# Patient Record
Sex: MALE | Race: WHITE | Employment: FULL TIME | URBAN - NONMETROPOLITAN AREA
[De-identification: names, ages, dates, MRNs, and addresses within clinical notes are randomized per-mention and may not be internally consistent; named-entity substitution may affect disease eponyms.]

---

## 2018-04-18 ENCOUNTER — HOSPITAL ENCOUNTER (OUTPATIENT)
Dept: ULTRASOUND IMAGING | Age: 58
Discharge: HOME | End: 2018-04-18
Payer: COMMERCIAL

## 2018-04-18 ENCOUNTER — HOSPITAL ENCOUNTER (OUTPATIENT)
Dept: CT IMAGING | Age: 58
Discharge: HOME | End: 2018-04-18
Payer: COMMERCIAL

## 2018-04-18 DIAGNOSIS — R31.0 GROSS HEMATURIA: ICD-10-CM

## 2018-04-18 PROCEDURE — 74178 CT ABD&PLV WO CNTR FLWD CNTR: CPT

## 2018-04-18 PROCEDURE — 76870 US EXAM SCROTUM: CPT

## 2018-04-18 PROCEDURE — 6360000004 HC RX CONTRAST MEDICATION: Performed by: UROLOGY

## 2018-04-18 RX ADMIN — IOPAMIDOL 100 ML: 755 INJECTION, SOLUTION INTRAVENOUS at 16:09

## 2018-09-24 ENCOUNTER — HOSPITAL ENCOUNTER (EMERGENCY)
Age: 58
Discharge: HOME | End: 2018-09-24
Attending: EMERGENCY MEDICINE
Payer: COMMERCIAL

## 2018-09-24 ENCOUNTER — APPOINTMENT (OUTPATIENT)
Dept: GENERAL RADIOLOGY | Age: 58
End: 2018-09-24
Payer: COMMERCIAL

## 2018-09-24 VITALS
WEIGHT: 265 LBS | OXYGEN SATURATION: 96 % | BODY MASS INDEX: 35.12 KG/M2 | DIASTOLIC BLOOD PRESSURE: 59 MMHG | SYSTOLIC BLOOD PRESSURE: 106 MMHG | RESPIRATION RATE: 16 BRPM | HEART RATE: 76 BPM | TEMPERATURE: 96.4 F | HEIGHT: 73 IN

## 2018-09-24 DIAGNOSIS — S46.912A LEFT SHOULDER STRAIN, INITIAL ENCOUNTER: Primary | ICD-10-CM

## 2018-09-24 PROCEDURE — 73030 X-RAY EXAM OF SHOULDER: CPT

## 2018-09-24 PROCEDURE — 2709999900 HC NON-CHARGEABLE SUPPLY

## 2018-09-24 PROCEDURE — 99283 EMERGENCY DEPT VISIT LOW MDM: CPT

## 2018-09-24 PROCEDURE — 6370000000 HC RX 637 (ALT 250 FOR IP): Performed by: EMERGENCY MEDICINE

## 2018-09-24 RX ORDER — IBUPROFEN 800 MG/1
800 TABLET ORAL ONCE
Status: COMPLETED | OUTPATIENT
Start: 2018-09-24 | End: 2018-09-24

## 2018-09-24 RX ADMIN — IBUPROFEN 800 MG: 800 TABLET ORAL at 12:04

## 2018-09-24 ASSESSMENT — PAIN DESCRIPTION - PAIN TYPE
TYPE: ACUTE PAIN
TYPE: ACUTE PAIN

## 2018-09-24 ASSESSMENT — PAIN DESCRIPTION - ORIENTATION
ORIENTATION: LEFT
ORIENTATION: LEFT

## 2018-09-24 ASSESSMENT — PAIN SCALES - GENERAL
PAINLEVEL_OUTOF10: 7
PAINLEVEL_OUTOF10: 9

## 2018-09-24 ASSESSMENT — PAIN DESCRIPTION - DESCRIPTORS
DESCRIPTORS: ACHING
DESCRIPTORS: ACHING

## 2018-09-24 ASSESSMENT — PAIN DESCRIPTION - LOCATION
LOCATION: SHOULDER
LOCATION: SHOULDER

## 2018-09-24 ASSESSMENT — ENCOUNTER SYMPTOMS: BACK PAIN: 0

## 2018-09-26 ENCOUNTER — HOSPITAL ENCOUNTER (OUTPATIENT)
Dept: GENERAL RADIOLOGY | Age: 58
Discharge: HOME | End: 2018-09-26
Payer: COMMERCIAL

## 2018-09-26 VITALS
WEIGHT: 265 LBS | OXYGEN SATURATION: 97 % | TEMPERATURE: 98.3 F | DIASTOLIC BLOOD PRESSURE: 60 MMHG | HEIGHT: 73 IN | HEART RATE: 74 BPM | RESPIRATION RATE: 16 BRPM | BODY MASS INDEX: 35.12 KG/M2 | SYSTOLIC BLOOD PRESSURE: 120 MMHG

## 2018-09-26 PROCEDURE — 99212 OFFICE O/P EST SF 10 MIN: CPT

## 2018-09-26 NOTE — PROGRESS NOTES
Pulse regular. Extremities warm. Respirations regular and even. Mucous membranes pink & moist. Alert et oriented x3. No nausea or vomiting. Range of motion within patient's limits. Skin pink, warm & dry. Calm et cooperative. No complaints. Patient released in stable, ambulatory condition to self.

## 2018-09-26 NOTE — PROGRESS NOTES
Pulse regular. Extremities warm. Respirations regular and quiet. Mucous membranes pink & moist. Alert and oriented times 3. No nausea or vomiting. Range of motion within patient's limits. Skin pink, warm and dry. Calm and cooperative. Patient is here for a recheck of his left shoulder after falling on it at work on 9/24/18. Pain is down to a 2/10 today and he wants to go back to working full duty.

## 2018-12-27 ENCOUNTER — HOSPITAL ENCOUNTER (OUTPATIENT)
Age: 58
End: 2018-12-27

## 2018-12-27 ENCOUNTER — HOSPITAL ENCOUNTER (OUTPATIENT)
Dept: MRI IMAGING | Age: 58
Discharge: HOME | End: 2018-12-27
Payer: COMMERCIAL

## 2018-12-27 DIAGNOSIS — S46.012A STRAIN OF MUSC/TEND THE ROTATOR CUFF OF LEFT SHOULDER, INIT: ICD-10-CM

## 2019-10-16 ENCOUNTER — HOSPITAL ENCOUNTER (OUTPATIENT)
Dept: GENERAL RADIOLOGY | Age: 59
Discharge: HOME | End: 2019-10-16
Payer: COMMERCIAL

## 2019-10-16 ENCOUNTER — HOSPITAL ENCOUNTER (OUTPATIENT)
Age: 59
Discharge: HOME | End: 2019-10-16
Payer: COMMERCIAL

## 2019-10-16 ENCOUNTER — OFFICE VISIT (OUTPATIENT)
Dept: RHEUMATOLOGY | Age: 59
End: 2019-10-16
Payer: COMMERCIAL

## 2019-10-16 VITALS
WEIGHT: 280 LBS | OXYGEN SATURATION: 97 % | DIASTOLIC BLOOD PRESSURE: 76 MMHG | HEIGHT: 73 IN | SYSTOLIC BLOOD PRESSURE: 134 MMHG | HEART RATE: 60 BPM | BODY MASS INDEX: 37.11 KG/M2

## 2019-10-16 DIAGNOSIS — M25.50 POLYARTHRALGIA: ICD-10-CM

## 2019-10-16 DIAGNOSIS — M54.42 CHRONIC MIDLINE LOW BACK PAIN WITH BILATERAL SCIATICA: ICD-10-CM

## 2019-10-16 DIAGNOSIS — R53.83 FATIGUE, UNSPECIFIED TYPE: ICD-10-CM

## 2019-10-16 DIAGNOSIS — M25.50 POLYARTHRALGIA: Primary | ICD-10-CM

## 2019-10-16 DIAGNOSIS — M25.50 PAIN IN JOINT, MULTIPLE SITES: ICD-10-CM

## 2019-10-16 DIAGNOSIS — G89.29 CHRONIC MIDLINE LOW BACK PAIN WITH BILATERAL SCIATICA: ICD-10-CM

## 2019-10-16 DIAGNOSIS — M54.41 CHRONIC MIDLINE LOW BACK PAIN WITH BILATERAL SCIATICA: ICD-10-CM

## 2019-10-16 LAB
ALBUMIN SERPL-MCNC: 4.6 G/DL (ref 3.5–5.1)
ALP BLD-CCNC: 67 U/L (ref 38–126)
ALT SERPL-CCNC: 27 U/L (ref 11–66)
ANION GAP SERPL CALCULATED.3IONS-SCNC: 12 MEQ/L (ref 8–16)
AST SERPL-CCNC: 18 U/L (ref 5–40)
BASOPHILS # BLD: 0.3 %
BASOPHILS ABSOLUTE: 0 THOU/MM3 (ref 0–0.1)
BILIRUB SERPL-MCNC: 0.5 MG/DL (ref 0.3–1.2)
BUN BLDV-MCNC: 27 MG/DL (ref 7–22)
C-REACTIVE PROTEIN: 0.25 MG/DL (ref 0–1)
CALCIUM SERPL-MCNC: 10 MG/DL (ref 8.5–10.5)
CHLORIDE BLD-SCNC: 99 MEQ/L (ref 98–111)
CO2: 25 MEQ/L (ref 23–33)
CREAT SERPL-MCNC: 1.2 MG/DL (ref 0.4–1.2)
EOSINOPHIL # BLD: 0.5 %
EOSINOPHILS ABSOLUTE: 0 THOU/MM3 (ref 0–0.4)
ERYTHROCYTE [DISTWIDTH] IN BLOOD BY AUTOMATED COUNT: 12.4 % (ref 11.5–14.5)
ERYTHROCYTE [DISTWIDTH] IN BLOOD BY AUTOMATED COUNT: 39.9 FL (ref 35–45)
GFR SERPL CREATININE-BSD FRML MDRD: 62 ML/MIN/1.73M2
GLUCOSE BLD-MCNC: 116 MG/DL (ref 70–108)
HCT VFR BLD CALC: 40.6 % (ref 42–52)
HEMOGLOBIN: 13.4 GM/DL (ref 14–18)
IMMATURE GRANS (ABS): 0.04 THOU/MM3 (ref 0–0.07)
IMMATURE GRANULOCYTES: 0.7 %
LYMPHOCYTES # BLD: 14.4 %
LYMPHOCYTES ABSOLUTE: 0.9 THOU/MM3 (ref 1–4.8)
MCH RBC QN AUTO: 29.1 PG (ref 26–33)
MCHC RBC AUTO-ENTMCNC: 33 GM/DL (ref 32.2–35.5)
MCV RBC AUTO: 88.3 FL (ref 80–94)
MONOCYTES # BLD: 4.2 %
MONOCYTES ABSOLUTE: 0.3 THOU/MM3 (ref 0.4–1.3)
NUCLEATED RED BLOOD CELLS: 0 /100 WBC
PLATELET # BLD: 244 THOU/MM3 (ref 130–400)
PMV BLD AUTO: 10.3 FL (ref 9.4–12.4)
POTASSIUM SERPL-SCNC: 4.6 MEQ/L (ref 3.5–5.2)
RBC # BLD: 4.6 MILL/MM3 (ref 4.7–6.1)
SEDIMENTATION RATE, ERYTHROCYTE: 10 MM/HR (ref 0–10)
SEG NEUTROPHILS: 79.9 %
SEGMENTED NEUTROPHILS ABSOLUTE COUNT: 4.8 THOU/MM3 (ref 1.8–7.7)
SODIUM BLD-SCNC: 136 MEQ/L (ref 135–145)
TOTAL PROTEIN: 7.7 G/DL (ref 6.1–8)
URIC ACID: 6.5 MG/DL (ref 3.7–7)
WBC # BLD: 6 THOU/MM3 (ref 4.8–10.8)

## 2019-10-16 PROCEDURE — 72072 X-RAY EXAM THORAC SPINE 3VWS: CPT

## 2019-10-16 PROCEDURE — 86140 C-REACTIVE PROTEIN: CPT

## 2019-10-16 PROCEDURE — 86235 NUCLEAR ANTIGEN ANTIBODY: CPT

## 2019-10-16 PROCEDURE — 73630 X-RAY EXAM OF FOOT: CPT

## 2019-10-16 PROCEDURE — 73130 X-RAY EXAM OF HAND: CPT

## 2019-10-16 PROCEDURE — 84550 ASSAY OF BLOOD/URIC ACID: CPT

## 2019-10-16 PROCEDURE — 36415 COLL VENOUS BLD VENIPUNCTURE: CPT

## 2019-10-16 PROCEDURE — 86038 ANTINUCLEAR ANTIBODIES: CPT

## 2019-10-16 PROCEDURE — 80074 ACUTE HEPATITIS PANEL: CPT

## 2019-10-16 PROCEDURE — 86200 CCP ANTIBODY: CPT

## 2019-10-16 PROCEDURE — 85651 RBC SED RATE NONAUTOMATED: CPT

## 2019-10-16 PROCEDURE — 85025 COMPLETE CBC W/AUTO DIFF WBC: CPT

## 2019-10-16 PROCEDURE — 99244 OFF/OP CNSLTJ NEW/EST MOD 40: CPT | Performed by: INTERNAL MEDICINE

## 2019-10-16 PROCEDURE — 72080 X-RAY EXAM THORACOLMB 2/> VW: CPT

## 2019-10-16 PROCEDURE — 86430 RHEUMATOID FACTOR TEST QUAL: CPT

## 2019-10-16 PROCEDURE — 80053 COMPREHEN METABOLIC PANEL: CPT

## 2019-10-16 RX ORDER — SPIRONOLACTONE 25 MG/1
25 TABLET ORAL DAILY
COMMUNITY

## 2019-10-16 RX ORDER — PREDNISONE 1 MG/1
TABLET ORAL
Qty: 40 TABLET | Refills: 0 | Status: SHIPPED | OUTPATIENT
Start: 2019-10-16 | End: 2019-11-01

## 2019-10-16 RX ORDER — TAMSULOSIN HYDROCHLORIDE 0.4 MG/1
0.4 CAPSULE ORAL DAILY
COMMUNITY
End: 2021-12-30

## 2019-10-16 RX ORDER — CETIRIZINE HYDROCHLORIDE 10 MG/1
10 TABLET ORAL DAILY
COMMUNITY

## 2019-10-16 ASSESSMENT — ENCOUNTER SYMPTOMS
EYE PAIN: 0
COUGH: 0
EYE REDNESS: 0
BACK PAIN: 1
CONSTIPATION: 0
VOMITING: 0
EYES NEGATIVE: 1
WHEEZING: 0
NAUSEA: 0
SHORTNESS OF BREATH: 0
DIARRHEA: 0

## 2019-10-17 LAB
HAV IGM SER IA-ACNC: NEGATIVE
HEPATITIS B CORE IGM ANTIBODY: NEGATIVE
HEPATITIS B SURFACE ANTIGEN: NEGATIVE
HEPATITIS C ANTIBODY: NEGATIVE
RHEUMATOID FACTOR: 15 IU/ML (ref 0–13)

## 2019-10-19 LAB
ANA SCREEN: DETECTED
ANTI SSA: NORMAL
ANTI SSB: 0 AU/ML (ref 0–40)
CYCLIC CITRULLIN PEPTIDE AB: 5 UNITS (ref 0–19)

## 2019-10-20 LAB
ANA PATTERN 2: ABNORMAL
ANA PATTERN: ABNORMAL
ANA TITER 2: ABNORMAL
ANA TITER: ABNORMAL
ANTINUCLEAR AB INTERPRETIVE COMMENT: ABNORMAL
ANTINUCLEAR ANTIBODY, HEP-2, IGG: DETECTED
CYTOPLASMIC PATTERN TITER: ABNORMAL

## 2019-10-21 DIAGNOSIS — R76.8 ANA POSITIVE: Primary | ICD-10-CM

## 2019-10-31 ENCOUNTER — TELEPHONE (OUTPATIENT)
Dept: RHEUMATOLOGY | Age: 59
End: 2019-10-31

## 2019-11-06 ENCOUNTER — NURSE ONLY (OUTPATIENT)
Dept: LAB | Age: 59
End: 2019-11-06

## 2019-11-06 DIAGNOSIS — R76.8 ANA POSITIVE: ICD-10-CM

## 2020-01-23 ENCOUNTER — OFFICE VISIT (OUTPATIENT)
Dept: RHEUMATOLOGY | Age: 60
End: 2020-01-23
Payer: COMMERCIAL

## 2020-01-23 ENCOUNTER — HOSPITAL ENCOUNTER (OUTPATIENT)
Age: 60
Discharge: HOME | End: 2020-01-23
Payer: COMMERCIAL

## 2020-01-23 VITALS
HEART RATE: 80 BPM | WEIGHT: 276.8 LBS | SYSTOLIC BLOOD PRESSURE: 92 MMHG | BODY MASS INDEX: 36.68 KG/M2 | HEIGHT: 73 IN | OXYGEN SATURATION: 96 % | DIASTOLIC BLOOD PRESSURE: 60 MMHG

## 2020-01-23 DIAGNOSIS — M46.90 AXIAL SPONDYLOARTHRITIS (HCC): ICD-10-CM

## 2020-01-23 DIAGNOSIS — G89.29 CHRONIC MIDLINE LOW BACK PAIN WITH BILATERAL SCIATICA: ICD-10-CM

## 2020-01-23 DIAGNOSIS — Z51.81 MEDICATION MONITORING ENCOUNTER: ICD-10-CM

## 2020-01-23 DIAGNOSIS — M54.42 CHRONIC MIDLINE LOW BACK PAIN WITH BILATERAL SCIATICA: ICD-10-CM

## 2020-01-23 DIAGNOSIS — M54.41 CHRONIC MIDLINE LOW BACK PAIN WITH BILATERAL SCIATICA: ICD-10-CM

## 2020-01-23 PROCEDURE — 99214 OFFICE O/P EST MOD 30 MIN: CPT | Performed by: INTERNAL MEDICINE

## 2020-01-23 PROCEDURE — 86480 TB TEST CELL IMMUN MEASURE: CPT

## 2020-01-23 PROCEDURE — 90670 PCV13 VACCINE IM: CPT | Performed by: INTERNAL MEDICINE

## 2020-01-23 PROCEDURE — 90471 IMMUNIZATION ADMIN: CPT | Performed by: INTERNAL MEDICINE

## 2020-01-23 ASSESSMENT — ENCOUNTER SYMPTOMS
EYES NEGATIVE: 1
EYE PAIN: 0
COUGH: 0
DIARRHEA: 0
EYE REDNESS: 0
WHEEZING: 0
BACK PAIN: 1
SHORTNESS OF BREATH: 0
NAUSEA: 0
CONSTIPATION: 0
VOMITING: 0

## 2020-01-23 NOTE — PROGRESS NOTES
fevers. : Aggravating -- wt bearing, alleviating -- non-wt bearing. , naproxen. Aggrevating : increased use. Alleviating: decreased. AM stiffness lasting all day. Thickening toenails, fatigue, + itching/redness of eyes w/ occasional eye pain, crusting of eyes in mornings improvedd w/ eye drops. -denies Photosenstivity, Rash, dry mouth/dry eyes, oral/nasal sores, Raynaud's, digital ulcerations, skin tightening, renal disease,foamy urination, hematuria, sz's, blood clots, AIHA,leukpenia/lymphopenia, thrombocytopenia, hair loss, serositis, s enthesitis, dactylitis, hx of STD,  personal or family history of Psoriatic arthritis, psoriasis, ank spond,       Cancer screening: up to date   Travel: denies   Exposure(s): denies   Occupation -- press opperator    Review of Systems    Review of Systems   Constitutional: Positive for fatigue. Negative for diaphoresis and fever. HENT: Negative for congestion, hearing loss and nosebleeds. Eyes: Negative. Negative for pain and redness. Respiratory: Negative for cough, shortness of breath and wheezing. Cardiovascular: Negative. Negative for chest pain. Gastrointestinal: Negative for constipation, diarrhea, nausea and vomiting. Genitourinary: Negative for difficulty urinating, frequency and hematuria. Musculoskeletal: Positive for arthralgias, back pain and myalgias. Negative for joint swelling. Skin: Negative for rash. Neurological: Negative for dizziness, weakness and headaches. Hematological: Does not bruise/bleed easily. Psychiatric/Behavioral: Positive for sleep disturbance (secondary to foot pain). The patient is not nervous/anxious.               PAST MEDICAL HISTORY      Past Medical History:   Diagnosis Date    GERD (gastroesophageal reflux disease)     Hypertension     Neuropathy     Osteoarthritis        PAST SURGICAL HISTORY      Past Surgical History:   Procedure Laterality Date    COLONOSCOPY  12/2019   Marsha Medina FOOT SURGERY  2009    atraumatic. Right Ear: External ear normal.      Left Ear: External ear normal.   Eyes:      Conjunctiva/sclera: Conjunctivae normal.      Pupils: Pupils are equal, round, and reactive to light. Neck:      Musculoskeletal: Neck supple. Cardiovascular:      Rate and Rhythm: Normal rate and regular rhythm. Heart sounds: Normal heart sounds. Pulmonary:      Effort: Pulmonary effort is normal.      Breath sounds: Normal breath sounds. Musculoskeletal: Normal range of motion. Lymphadenopathy:      Cervical: No cervical adenopathy. Skin:     General: Skin is warm and dry. Comments: Nail pitting bilat hands. Onycholysis toenails   Neurological:      Mental Status: He is alert and oriented to person, place, and time. Psychiatric:         Mood and Affect: Affect normal.             Musculoskeletal:     Normal gait. Strength 5/5 in biceps, triceps, hips, knees,      Upper extremities:   Shoulders: Non-tender, No swelling , No Deformities and intact ROM  Elbows:  . Non-tender, No swelling ,   Wrists  Tender - bilat. Hands:  CMCs: non-tender, no swelling  MCPs tender bilateral 2-5 , NO swelling  PIPs Tender bilat 2-5, boggy left 3,4 , right 3,4   DIPs [place]berden nodes bilat. + tender (mild) bilat. Lower extremities:  Hips: Non-tender, No swelling , No Deformities and intact ROM  Knees : tender - warmth - Rt knee. Ankles: Tender bilat. No swelling , No Deformities and intact ROM  Feet:   - tender bilat. Mid foot. & MTPs   - swan neck changes of 2nd left oe. - pes planus   Spine:     C-spine: intact ROM, Non-tender, no swelling. no pain to palpation  + sakina, + Rt SLR , + shober 10-14 cm. Occiput to wall testing.          RAPID3 Composite Score MDHAQ (0-10) + Patient pain VAS (0-10): + Patient global assessment VAS (0-10):     1/23/2020 --- RAPID 3: 2 + 7.5 + 5.5 = 15       Remission: <3  Low Disease Activity: <6  Moderate Disease Activity: >=6 and <=12  High Disease Activity: >12    LABS        CBC  Lab Results   Component Value Date    WBC 6.0 10/16/2019    RBC 4.60 10/16/2019    HGB 13.4 10/16/2019    HCT 40.6 10/16/2019    MCV 88.3 10/16/2019    MCH 29.1 10/16/2019    MCHC 33.0 10/16/2019     10/16/2019       CMP  Lab Results   Component Value Date    CALCIUM 10.0 10/16/2019    LABALBU 4.6 10/16/2019    PROT 7.7 10/16/2019     10/16/2019    K 4.6 10/16/2019    CO2 25 10/16/2019    CL 99 10/16/2019    BUN 27 10/16/2019    CREATININE 1.2 10/16/2019    ALKPHOS 67 10/16/2019    ALT 27 10/16/2019    AST 18 10/16/2019       HgBA1c: No components found for: HGBA1C    No results found for: TSHFT4, TSH  No results found for: VITD25      Lab Results   Component Value Date    ANASCRN Detected (A) 10/16/2019     Lab Results   Component Value Date    SSA SEE BELOW 10/16/2019     Lab Results   Component Value Date    SSB 0 10/16/2019     No results found for: ANTI-SMITH  No results found for: DSDNAAB   No results found for: ANTIRNP  No results found for: C3, C4  Lab Results   Component Value Date    CCPAB 5 10/16/2019     Lab Results   Component Value Date    RF 15 (H) 10/16/2019       No components found for: CANCASCRN, APANCASCRN  Lab Results   Component Value Date    SEDRATE 10 10/16/2019     Lab Results   Component Value Date    CRP 0.25 10/16/2019       RADIOLOGY:     MRI foot 8/21/13:    IMPRESSION:        1.  SMALL 9 X 7 X 3 MM AREA OF INCREASED ENHANCEMENT POST CONTRAST        INJECTION ALONG THE PLANTAR ASPECT OF THE FIRST INTERMETATARSAL SPACE        BETWEEN THE FIRST AND SECOND METATARSAL HEADS, THIS MAY BE RELATED TO A        MATA'S NEUROMA OR SOME INFLAMMATORY CHANGES OR GRANULATION TISSUE IN THE        SOFT TISSUE.        2.  CHANGES SUGGESTIVE OF SOME SCARRING ALONG THE DORSAL AND MID ASPECT OF        THE SECOND INTERMETATARSAL SPACE BETWEEN THE SECOND AND THIRD        METATARSOPHALANGEAL JOINTS, THIS MAY BE POST SURGICAL IN NATURE TO BE        CORRELATED WITH THE 1/23/2020              No follow-ups on file. Electronically signed by Mora Olmos DO on 1/23/2020 at 3:37 PM    New Prescriptions    No medications on file       Thank you for allowing me to participate in the care of this patient. Please call if there are any questions.

## 2020-01-23 NOTE — PROGRESS NOTES
Immunizations Administered     Name Date Dose Route    Pneumococcal Conjugate 13-valent ([id number]) 1/23/2020 0.5 mL Intramuscular    Site: Deltoid- Right    Lot: [id number]    NDC: 1548-1125-40          Given by Finesse Alcantara CMA ([facility]) without incident.  Patient tolerated well

## 2020-01-27 LAB
QUANTI TB GOLD PLUS: NEGATIVE
QUANTI TB1 MINUS NIL: 0 IU/ML (ref 0–0.34)
QUANTI TB2 MINUS NIL: 0.01 IU/ML (ref 0–0.34)
QUANTIFERON MITOGEN MINUS NIL: 7 IU/ML
QUANTIFERON NIL: 0.03 IU/ML

## 2020-04-30 ENCOUNTER — OFFICE VISIT (OUTPATIENT)
Dept: RHEUMATOLOGY | Age: 60
End: 2020-04-30
Payer: COMMERCIAL

## 2020-04-30 VITALS
SYSTOLIC BLOOD PRESSURE: 118 MMHG | HEIGHT: 73 IN | DIASTOLIC BLOOD PRESSURE: 66 MMHG | BODY MASS INDEX: 36.45 KG/M2 | RESPIRATION RATE: 16 BRPM | OXYGEN SATURATION: 97 % | TEMPERATURE: 98.1 F | WEIGHT: 275 LBS | HEART RATE: 62 BPM

## 2020-04-30 PROCEDURE — 99214 OFFICE O/P EST MOD 30 MIN: CPT | Performed by: INTERNAL MEDICINE

## 2020-04-30 RX ORDER — GABAPENTIN 300 MG/1
CAPSULE ORAL
Qty: 63 CAPSULE | Refills: 0 | Status: SHIPPED | OUTPATIENT
Start: 2020-04-30 | End: 2020-09-03 | Stop reason: ALTCHOICE

## 2020-04-30 RX ORDER — SULFASALAZINE 500 MG/1
TABLET ORAL
Qty: 98 TABLET | Refills: 0 | Status: SHIPPED | OUTPATIENT
Start: 2020-04-30 | End: 2020-06-30

## 2020-04-30 ASSESSMENT — ENCOUNTER SYMPTOMS
NAUSEA: 0
DIARRHEA: 0
WHEEZING: 0
CONSTIPATION: 0
EYE PAIN: 0
COUGH: 0
SHORTNESS OF BREATH: 0
BACK PAIN: 1
VOMITING: 0
EYE REDNESS: 0
EYES NEGATIVE: 1

## 2020-05-01 ENCOUNTER — HOSPITAL ENCOUNTER (OUTPATIENT)
Age: 60
Discharge: HOME | End: 2020-05-01
Payer: COMMERCIAL

## 2020-05-01 DIAGNOSIS — Z51.81 MEDICATION MONITORING ENCOUNTER: ICD-10-CM

## 2020-05-01 LAB
ALBUMIN SERPL-MCNC: 4.5 G/DL (ref 3.5–5.1)
ALP BLD-CCNC: 63 U/L (ref 38–126)
ALT SERPL-CCNC: 22 U/L (ref 11–66)
ANION GAP SERPL CALCULATED.3IONS-SCNC: 6 MEQ/L (ref 8–16)
AST SERPL-CCNC: 17 U/L (ref 5–40)
BASOPHILS # BLD: 0.9 %
BASOPHILS ABSOLUTE: 0 THOU/MM3 (ref 0–0.1)
BILIRUB SERPL-MCNC: 0.6 MG/DL (ref 0.3–1.2)
BUN BLDV-MCNC: 24 MG/DL (ref 7–22)
C-REACTIVE PROTEIN: 0.18 MG/DL (ref 0–1)
CALCIUM SERPL-MCNC: 9.5 MG/DL (ref 8.5–10.5)
CHLORIDE BLD-SCNC: 101 MEQ/L (ref 98–111)
CO2: 28 MEQ/L (ref 23–33)
CREAT SERPL-MCNC: 0.9 MG/DL (ref 0.4–1.2)
EOSINOPHIL # BLD: 2.8 %
EOSINOPHILS ABSOLUTE: 0.1 THOU/MM3 (ref 0–0.4)
ERYTHROCYTE [DISTWIDTH] IN BLOOD BY AUTOMATED COUNT: 13.2 % (ref 11.5–14.5)
ERYTHROCYTE [DISTWIDTH] IN BLOOD BY AUTOMATED COUNT: 42.6 FL (ref 35–45)
GFR SERPL CREATININE-BSD FRML MDRD: 86 ML/MIN/1.73M2
GLUCOSE BLD-MCNC: 106 MG/DL (ref 70–108)
HCT VFR BLD CALC: 43.1 % (ref 42–52)
HEMOGLOBIN: 14.2 GM/DL (ref 14–18)
IMMATURE GRANS (ABS): 0.02 THOU/MM3 (ref 0–0.07)
IMMATURE GRANULOCYTES: 0.4 %
LYMPHOCYTES # BLD: 35.4 %
LYMPHOCYTES ABSOLUTE: 1.6 THOU/MM3 (ref 1–4.8)
MCH RBC QN AUTO: 29.2 PG (ref 26–33)
MCHC RBC AUTO-ENTMCNC: 32.9 GM/DL (ref 32.2–35.5)
MCV RBC AUTO: 88.5 FL (ref 80–94)
MONOCYTES # BLD: 12.2 %
MONOCYTES ABSOLUTE: 0.6 THOU/MM3 (ref 0.4–1.3)
NUCLEATED RED BLOOD CELLS: 0 /100 WBC
PLATELET # BLD: 210 THOU/MM3 (ref 130–400)
PMV BLD AUTO: 10.9 FL (ref 9.4–12.4)
POTASSIUM SERPL-SCNC: 4.6 MEQ/L (ref 3.5–5.2)
RBC # BLD: 4.87 MILL/MM3 (ref 4.7–6.1)
SEDIMENTATION RATE, ERYTHROCYTE: 10 MM/HR (ref 0–10)
SEG NEUTROPHILS: 48.3 %
SEGMENTED NEUTROPHILS ABSOLUTE COUNT: 2.2 THOU/MM3 (ref 1.8–7.7)
SODIUM BLD-SCNC: 135 MEQ/L (ref 135–145)
TOTAL PROTEIN: 7.5 G/DL (ref 6.1–8)
WBC # BLD: 4.6 THOU/MM3 (ref 4.8–10.8)

## 2020-05-01 PROCEDURE — 85025 COMPLETE CBC W/AUTO DIFF WBC: CPT

## 2020-05-01 PROCEDURE — 86140 C-REACTIVE PROTEIN: CPT

## 2020-05-01 PROCEDURE — 80053 COMPREHEN METABOLIC PANEL: CPT

## 2020-05-01 PROCEDURE — 85651 RBC SED RATE NONAUTOMATED: CPT

## 2020-05-01 PROCEDURE — 36415 COLL VENOUS BLD VENIPUNCTURE: CPT

## 2020-05-05 NOTE — RESULT ENCOUNTER NOTE
Blood testing revealed a mild low white blood cell count. The remainder of blood tests were clinically stable.   Please have labs repeated in 4 weeks with the recent start of sulfasalazine

## 2020-06-08 ENCOUNTER — HOSPITAL ENCOUNTER (OUTPATIENT)
Age: 60
Discharge: HOME | End: 2020-06-08
Payer: COMMERCIAL

## 2020-06-08 DIAGNOSIS — Z51.81 MEDICATION MONITORING ENCOUNTER: ICD-10-CM

## 2020-06-08 LAB
ALBUMIN SERPL-MCNC: 4.5 G/DL (ref 3.5–5.1)
ALP BLD-CCNC: 61 U/L (ref 38–126)
ALT SERPL-CCNC: 23 U/L (ref 11–66)
ANION GAP SERPL CALCULATED.3IONS-SCNC: 11 MEQ/L (ref 8–16)
AST SERPL-CCNC: 19 U/L (ref 5–40)
BASOPHILS # BLD: 0.5 %
BASOPHILS ABSOLUTE: 0 THOU/MM3 (ref 0–0.1)
BILIRUB SERPL-MCNC: 0.5 MG/DL (ref 0.3–1.2)
BUN BLDV-MCNC: 30 MG/DL (ref 7–22)
C-REACTIVE PROTEIN: 0.09 MG/DL (ref 0–1)
CALCIUM SERPL-MCNC: 9.5 MG/DL (ref 8.5–10.5)
CHLORIDE BLD-SCNC: 102 MEQ/L (ref 98–111)
CO2: 23 MEQ/L (ref 23–33)
CREAT SERPL-MCNC: 1.4 MG/DL (ref 0.4–1.2)
EOSINOPHIL # BLD: 1.2 %
EOSINOPHILS ABSOLUTE: 0.1 THOU/MM3 (ref 0–0.4)
ERYTHROCYTE [DISTWIDTH] IN BLOOD BY AUTOMATED COUNT: 13 % (ref 11.5–14.5)
ERYTHROCYTE [DISTWIDTH] IN BLOOD BY AUTOMATED COUNT: 42.5 FL (ref 35–45)
GFR SERPL CREATININE-BSD FRML MDRD: 52 ML/MIN/1.73M2
GLUCOSE BLD-MCNC: 85 MG/DL (ref 70–108)
HCT VFR BLD CALC: 40.6 % (ref 42–52)
HEMOGLOBIN: 13.2 GM/DL (ref 14–18)
IMMATURE GRANS (ABS): 0.02 THOU/MM3 (ref 0–0.07)
IMMATURE GRANULOCYTES: 0.3 %
LYMPHOCYTES # BLD: 25 %
LYMPHOCYTES ABSOLUTE: 1.5 THOU/MM3 (ref 1–4.8)
MCH RBC QN AUTO: 29.3 PG (ref 26–33)
MCHC RBC AUTO-ENTMCNC: 32.5 GM/DL (ref 32.2–35.5)
MCV RBC AUTO: 90 FL (ref 80–94)
MONOCYTES # BLD: 13.7 %
MONOCYTES ABSOLUTE: 0.8 THOU/MM3 (ref 0.4–1.3)
NUCLEATED RED BLOOD CELLS: 0 /100 WBC
PLATELET # BLD: 238 THOU/MM3 (ref 130–400)
PMV BLD AUTO: 10.5 FL (ref 9.4–12.4)
POTASSIUM SERPL-SCNC: 4.3 MEQ/L (ref 3.5–5.2)
RBC # BLD: 4.51 MILL/MM3 (ref 4.7–6.1)
SEDIMENTATION RATE, ERYTHROCYTE: 8 MM/HR (ref 0–10)
SEG NEUTROPHILS: 59.3 %
SEGMENTED NEUTROPHILS ABSOLUTE COUNT: 3.6 THOU/MM3 (ref 1.8–7.7)
SODIUM BLD-SCNC: 136 MEQ/L (ref 135–145)
TOTAL PROTEIN: 7.3 G/DL (ref 6.1–8)
WBC # BLD: 6.1 THOU/MM3 (ref 4.8–10.8)

## 2020-06-08 PROCEDURE — 86140 C-REACTIVE PROTEIN: CPT

## 2020-06-08 PROCEDURE — 85651 RBC SED RATE NONAUTOMATED: CPT

## 2020-06-08 PROCEDURE — 36415 COLL VENOUS BLD VENIPUNCTURE: CPT

## 2020-06-08 PROCEDURE — 85025 COMPLETE CBC W/AUTO DIFF WBC: CPT

## 2020-06-08 PROCEDURE — 80053 COMPREHEN METABOLIC PANEL: CPT

## 2020-06-10 ENCOUNTER — TELEPHONE (OUTPATIENT)
Dept: RHEUMATOLOGY | Age: 60
End: 2020-06-10

## 2020-06-10 NOTE — TELEPHONE ENCOUNTER
Patient states that only med change was with Dr. Menard Hotter increased to 2 pills. Broke out in rash now all over. Patient taking naproxen. He doesn't have a script for mobic he says just the naproxen. Please advise.

## 2020-06-29 ENCOUNTER — HOSPITAL ENCOUNTER (OUTPATIENT)
Age: 60
Discharge: HOME | End: 2020-06-29
Payer: COMMERCIAL

## 2020-06-29 DIAGNOSIS — R79.89 CREATININE ELEVATION: ICD-10-CM

## 2020-06-29 LAB
ANION GAP SERPL CALCULATED.3IONS-SCNC: 13 MEQ/L (ref 8–16)
BUN BLDV-MCNC: 29 MG/DL (ref 7–22)
CALCIUM SERPL-MCNC: 9 MG/DL (ref 8.5–10.5)
CHLORIDE BLD-SCNC: 102 MEQ/L (ref 98–111)
CO2: 22 MEQ/L (ref 23–33)
CREAT SERPL-MCNC: 1.3 MG/DL (ref 0.4–1.2)
GFR SERPL CREATININE-BSD FRML MDRD: 56 ML/MIN/1.73M2
GLUCOSE BLD-MCNC: 102 MG/DL (ref 70–108)
POTASSIUM SERPL-SCNC: 4.3 MEQ/L (ref 3.5–5.2)
SODIUM BLD-SCNC: 137 MEQ/L (ref 135–145)

## 2020-06-29 PROCEDURE — 80048 BASIC METABOLIC PNL TOTAL CA: CPT

## 2020-06-29 PROCEDURE — 36415 COLL VENOUS BLD VENIPUNCTURE: CPT

## 2020-06-30 RX ORDER — SULFASALAZINE 500 MG/1
1000 TABLET ORAL 2 TIMES DAILY
Qty: 120 TABLET | Refills: 0 | Status: SHIPPED | OUTPATIENT
Start: 2020-06-30 | End: 2021-04-05

## 2020-06-30 NOTE — PROGRESS NOTES
Diagnosis Orders   1. Axial spondyloarthritis (HCC)  sulfaSALAzine (AZULFIDINE) 500 MG tablet     Labs q 4 weeks x 1 or med monitoring labs.

## 2020-07-02 ENCOUNTER — OFFICE VISIT (OUTPATIENT)
Dept: RHEUMATOLOGY | Age: 60
End: 2020-07-02
Payer: COMMERCIAL

## 2020-07-02 VITALS
SYSTOLIC BLOOD PRESSURE: 130 MMHG | HEIGHT: 73 IN | HEART RATE: 53 BPM | OXYGEN SATURATION: 98 % | DIASTOLIC BLOOD PRESSURE: 72 MMHG | BODY MASS INDEX: 35.25 KG/M2 | WEIGHT: 266 LBS

## 2020-07-02 PROCEDURE — 99214 OFFICE O/P EST MOD 30 MIN: CPT | Performed by: NURSE PRACTITIONER

## 2020-07-02 RX ORDER — ETANERCEPT 50 MG/ML
50 SOLUTION SUBCUTANEOUS WEEKLY
Qty: 4 SYRINGE | Refills: 5 | Status: SHIPPED | OUTPATIENT
Start: 2020-07-02 | End: 2020-09-03 | Stop reason: ALTCHOICE

## 2020-07-02 ASSESSMENT — ENCOUNTER SYMPTOMS
SHORTNESS OF BREATH: 0
BACK PAIN: 1
DIARRHEA: 0
TROUBLE SWALLOWING: 0
ABDOMINAL PAIN: 0
EYE PAIN: 0
COUGH: 0
NAUSEA: 0
CONSTIPATION: 0
EYE ITCHING: 0

## 2020-07-02 NOTE — PROGRESS NOTES
[facility] RHEUMATOLOGY FOLLOW UP NOTE       Date Of Service: 7/2/2020  Provider: LOYDA Constantino - GUANAKO    Name: Sal Smith   MRN: [id number]    Chief Complaint(s)     Chief Complaint   Patient presents with    Follow-up     2 month- Axial spondyloarthritis        History of Present Illness (HPI)     Sal Smith  is a(n)59 y.o. male with a hx of HTN, neuropathy, GERD, DDD cervical spine, BPH, venous stasis here for the f/u evaluation of polyarthralgia, bilateral foot pain. Interval hx:    - patient was off sulfasalazine, just restarted medication today. pain affecting the bilateral hands, knees, ankles, toes, back  Pain on a scale 0-10: 6.5/10  Type of pain: constant  Timing:afternoon  Aggravating factors: increased use, wt bearing  Alleviating factors: n/a    Associated symptoms:  denies swelling/  Redness/ warmth, + AM stiffness lasting ~ \"until he takes hot shower\"        REVIEW OF SYSTEMS: (ROS)    Review of Systems   Constitutional: Negative for fatigue, fever and unexpected weight change. HENT: Negative for congestion and trouble swallowing. Eyes: Negative for pain and itching. Respiratory: Negative for cough and shortness of breath. Cardiovascular: Negative for chest pain and leg swelling. Gastrointestinal: Negative for abdominal pain, constipation, diarrhea and nausea. Endocrine: Negative for cold intolerance and heat intolerance. Genitourinary: Negative for difficulty urinating, frequency and urgency. Musculoskeletal: Positive for arthralgias, back pain and myalgias. Negative for joint swelling. Skin: Negative for rash. Neurological: Positive for numbness. Negative for dizziness, weakness and headaches. Psychiatric/Behavioral: Positive for sleep disturbance. The patient is not nervous/anxious.         PAST MEDICAL HISTORY      Past Medical History:   Diagnosis Date    GERD (gastroesophageal reflux disease)     Hypertension     Neuropathy     RBC 4.51 06/08/2020    HGB 13.2 06/08/2020    HCT 40.6 06/08/2020    MCV 90.0 06/08/2020    MCH 29.3 06/08/2020    MCHC 32.5 06/08/2020     06/08/2020       CMP  Lab Results   Component Value Date    CALCIUM 9.0 06/29/2020    LABALBU 4.5 06/08/2020    PROT 7.3 06/08/2020     06/29/2020    K 4.3 06/29/2020    CO2 22 06/29/2020     06/29/2020    BUN 29 06/29/2020    CREATININE 1.3 06/29/2020    ALKPHOS 61 06/08/2020    ALT 23 06/08/2020    AST 19 06/08/2020       HgBA1c: No components found for: HGBA1C    No results found for: VITD25      Lab Results   Component Value Date    ANASCRN Detected (A) 10/16/2019     Lab Results   Component Value Date    SSA SEE BELOW 10/16/2019     Lab Results   Component Value Date    SSB 0 10/16/2019     No results found for: ANTI-SMITH  No results found for: DSDNAAB   No results found for: ANTIRNP  No results found for: C3, C4  Lab Results   Component Value Date    CCPAB 5 10/16/2019     Lab Results   Component Value Date    RF 15 (H) 10/16/2019       No components found for: CANCASCRN, APANCASCRN  Lab Results   Component Value Date    SEDRATE 8 06/08/2020     Lab Results   Component Value Date    CRP 0.09 06/08/2020       RADIOLOGY:         ASSESSMENT/PLAN    Assessment   Plan     Undiff axial spondyloarthropathy  - hand pain w/ CTS sx's, foot pain s/p 4 surgeries w/ continued pain, back pain since 50's w/ associated radicular sx's. Reported joint swelling left toes and fingers bilat. AM stiffness > 60 mins. + fatigue, + dry eyes, nail pitting bilateral hands, onycholysis toenails.  Exam w/ + joint swelling MCPs and left knee warmth  - prior tx: prednisone (no relief)   - mobic 15 mg daily PRN   - enbrel 50 mg weekly   - sulfasalazine 1000 mg BID- restarted this morning     Chronic midline low back pain with bilateral sciatica  - pt w/ pseudoclaudication sx's but intact DTR's and muscle strength. + fall 15 feet 26 years ago w/ 3 broken   - gabapentin 300 mg BID    Medication monitoring   - TB gold negative (1/23/2020)   - CBC, CMP, sed rate, CRP q 4 weeks x3 w/ SSZ restart    Health Maintenance    - prevnar 13 (4/30/2020)      No follow-ups on file. Electronically signed by LOYDA Baumann CNP on 7/2/2020 at 2:00 PM    New Prescriptions    No medications on file       Thank you for allowing me to participate in the care of this patient. Please call if there are any questions.

## 2020-08-12 ENCOUNTER — PATIENT MESSAGE (OUTPATIENT)
Dept: RHEUMATOLOGY | Age: 60
End: 2020-08-12

## 2020-08-13 NOTE — TELEPHONE ENCOUNTER
From: Mary Figures  To: Kassie Hernandez DO  Sent: 8/12/2020 3:41 PM EDT  Subject: Non-Urgent Medical Question    The sulfasalazine is not helping. I think it time to go in a different direction. Back legs and feet are bothering me more than ever. The embrel dont seem to be helping much either.

## 2020-08-19 ENCOUNTER — TELEPHONE (OUTPATIENT)
Dept: RHEUMATOLOGY | Age: 60
End: 2020-08-19

## 2020-08-27 ENCOUNTER — HOSPITAL ENCOUNTER (OUTPATIENT)
Age: 60
Discharge: HOME | End: 2020-08-27
Payer: COMMERCIAL

## 2020-08-27 ENCOUNTER — HOSPITAL ENCOUNTER (OUTPATIENT)
Dept: GENERAL RADIOLOGY | Age: 60
Discharge: HOME | End: 2020-08-27
Payer: COMMERCIAL

## 2020-08-27 DIAGNOSIS — M46.90 AXIAL SPONDYLOARTHRITIS (HCC): ICD-10-CM

## 2020-08-27 DIAGNOSIS — Z51.81 MEDICATION MONITORING ENCOUNTER: ICD-10-CM

## 2020-08-27 LAB
BASOPHILS # BLD: 0.9 %
BASOPHILS ABSOLUTE: 0 THOU/MM3 (ref 0–0.1)
EOSINOPHIL # BLD: 5.6 %
EOSINOPHILS ABSOLUTE: 0.2 THOU/MM3 (ref 0–0.4)
ERYTHROCYTE [DISTWIDTH] IN BLOOD BY AUTOMATED COUNT: 12.7 % (ref 11.5–14.5)
ERYTHROCYTE [DISTWIDTH] IN BLOOD BY AUTOMATED COUNT: 43.4 FL (ref 35–45)
HCT VFR BLD CALC: 38.3 % (ref 42–52)
HEMOGLOBIN: 12.2 GM/DL (ref 14–18)
IMMATURE GRANS (ABS): 0.01 THOU/MM3 (ref 0–0.07)
IMMATURE GRANULOCYTES: 0.2 %
LYMPHOCYTES # BLD: 36.8 %
LYMPHOCYTES ABSOLUTE: 1.6 THOU/MM3 (ref 1–4.8)
MCH RBC QN AUTO: 29.6 PG (ref 26–33)
MCHC RBC AUTO-ENTMCNC: 31.9 GM/DL (ref 32.2–35.5)
MCV RBC AUTO: 93 FL (ref 80–94)
MONOCYTES # BLD: 11.2 %
MONOCYTES ABSOLUTE: 0.5 THOU/MM3 (ref 0.4–1.3)
NUCLEATED RED BLOOD CELLS: 0 /100 WBC
PLATELET # BLD: 230 THOU/MM3 (ref 130–400)
PMV BLD AUTO: 10.9 FL (ref 9.4–12.4)
RBC # BLD: 4.12 MILL/MM3 (ref 4.7–6.1)
SEDIMENTATION RATE, ERYTHROCYTE: 14 MM/HR (ref 0–10)
SEG NEUTROPHILS: 45.3 %
SEGMENTED NEUTROPHILS ABSOLUTE COUNT: 1.9 THOU/MM3 (ref 1.8–7.7)
WBC # BLD: 4.3 THOU/MM3 (ref 4.8–10.8)

## 2020-08-27 PROCEDURE — 85025 COMPLETE CBC W/AUTO DIFF WBC: CPT

## 2020-08-27 PROCEDURE — 86140 C-REACTIVE PROTEIN: CPT

## 2020-08-27 PROCEDURE — 36415 COLL VENOUS BLD VENIPUNCTURE: CPT

## 2020-08-27 PROCEDURE — 85651 RBC SED RATE NONAUTOMATED: CPT

## 2020-08-27 PROCEDURE — 72100 X-RAY EXAM L-S SPINE 2/3 VWS: CPT

## 2020-08-27 PROCEDURE — 80053 COMPREHEN METABOLIC PANEL: CPT

## 2020-08-28 LAB
ALBUMIN SERPL-MCNC: 4.2 G/DL (ref 3.5–5.1)
ALP BLD-CCNC: 55 U/L (ref 38–126)
ALT SERPL-CCNC: 18 U/L (ref 11–66)
ANION GAP SERPL CALCULATED.3IONS-SCNC: 11 MEQ/L (ref 8–16)
AST SERPL-CCNC: 15 U/L (ref 5–40)
BILIRUB SERPL-MCNC: 0.4 MG/DL (ref 0.3–1.2)
BUN BLDV-MCNC: 22 MG/DL (ref 7–22)
C-REACTIVE PROTEIN: 0.09 MG/DL (ref 0–1)
CALCIUM SERPL-MCNC: 9.3 MG/DL (ref 8.5–10.5)
CHLORIDE BLD-SCNC: 102 MEQ/L (ref 98–111)
CO2: 25 MEQ/L (ref 23–33)
CREAT SERPL-MCNC: 1.1 MG/DL (ref 0.4–1.2)
GFR SERPL CREATININE-BSD FRML MDRD: 68 ML/MIN/1.73M2
GLUCOSE BLD-MCNC: 102 MG/DL (ref 70–108)
POTASSIUM SERPL-SCNC: 4 MEQ/L (ref 3.5–5.2)
SODIUM BLD-SCNC: 138 MEQ/L (ref 135–145)
TOTAL PROTEIN: 7 G/DL (ref 6.1–8)

## 2020-08-28 NOTE — TELEPHONE ENCOUNTER
Diagnosis Orders   1.  Chronic midline low back pain with bilateral sciatica  XR LUMBAR SPINE (2-3 VIEWS)    MRI LUMBAR SPINE WO CONTRAST

## 2020-09-03 ENCOUNTER — OFFICE VISIT (OUTPATIENT)
Dept: RHEUMATOLOGY | Age: 60
End: 2020-09-03
Payer: COMMERCIAL

## 2020-09-03 VITALS
SYSTOLIC BLOOD PRESSURE: 102 MMHG | BODY MASS INDEX: 34.3 KG/M2 | TEMPERATURE: 97.6 F | HEIGHT: 73 IN | HEART RATE: 63 BPM | OXYGEN SATURATION: 96 % | DIASTOLIC BLOOD PRESSURE: 64 MMHG | WEIGHT: 258.8 LBS

## 2020-09-03 PROCEDURE — 99214 OFFICE O/P EST MOD 30 MIN: CPT | Performed by: INTERNAL MEDICINE

## 2020-09-03 RX ORDER — AMITRIPTYLINE HYDROCHLORIDE 25 MG/1
25 TABLET, FILM COATED ORAL NIGHTLY
Qty: 30 TABLET | Refills: 1 | Status: SHIPPED | OUTPATIENT
Start: 2020-09-03 | End: 2020-09-04

## 2020-09-03 RX ORDER — SECUKINUMAB 150 MG/ML
INJECTION SUBCUTANEOUS
Qty: 5 ML | Refills: 1 | Status: SHIPPED | OUTPATIENT
Start: 2020-09-03 | End: 2020-09-18 | Stop reason: SDUPTHER

## 2020-09-03 ASSESSMENT — ENCOUNTER SYMPTOMS
EYES NEGATIVE: 1
CONSTIPATION: 0
SHORTNESS OF BREATH: 0
BACK PAIN: 1
EYE PAIN: 0
WHEEZING: 0
COUGH: 0
VOMITING: 0
EYE REDNESS: 0
NAUSEA: 0
DIARRHEA: 0

## 2020-09-03 NOTE — PROGRESS NOTES
[facility] UP NOTE       Date Of Service: 9/3/2020  Provider: Moy Howard DO    Name: Deniece Romberg   MRN: [id number]    Chief Complaint(s)     Chief Complaint   Patient presents with    Follow-up     4 month follow up        History of Present Illness (HPI)     Deniece Romberg  is a(n)60 y.o. male with a hx of hx of HTN, neuropathy, GERD, DDD cervical spine, BPH, venous stasis here for the f/u evaluation of polyarthralgia, bilateral foot pain. No changes. Continued bilateral hands, wrists, right hip, bilatearl knees, ankles, feet, neck and lower back. Pain up to 7/10 over the past week, constant == feet, legs, and lower back. Tingling in legs all the time. Timing: mornings and evenings after sitting. Aggravating factors: wt bearing, inactivity  Alleviating factors: movement. Paresthesia/tingling in the legs more with sitting. Decreased strength of legs. Balance issues b/c numbness of feet. REVIEW OF SYSTEMS: (ROS)    Review of Systems   Constitutional: Negative for diaphoresis, fatigue and fever. HENT: Negative for congestion, hearing loss and nosebleeds. Eyes: Negative. Negative for pain and redness. Respiratory: Negative for cough, shortness of breath and wheezing. Cardiovascular: Negative. Negative for chest pain. Gastrointestinal: Negative for constipation, diarrhea, nausea and vomiting. Genitourinary: Negative for difficulty urinating, frequency and hematuria. Musculoskeletal: Positive for arthralgias, back pain and myalgias. Skin: Negative for rash. Neurological: Positive for numbness. Negative for dizziness, weakness and headaches. Hematological: Does not bruise/bleed easily. Psychiatric/Behavioral: Negative for sleep disturbance. The patient is not nervous/anxious. PAST MEDICAL HISTORY     has a past medical history of GERD (gastroesophageal reflux disease), Hypertension, Neuropathy, and Osteoarthritis. PAST SURGICAL HISTORY     has a past surgical history that includes knee surgery; Foot surgery (2009); lipoma resection (2010); Rotator cuff repair (Left, 08/2019); and Colonoscopy (12/2019). [facility] FAMILY HISTORY      Family History   Problem Relation Age of Onset    Cancer Mother         brain    High Blood Pressure Father     High Blood Pressure Brother     Diabetes Brother        SOCIAL HISTORY     reports that he has never smoked. He has never used smokeless tobacco. He reports current alcohol use of about 12.0 standard drinks of alcohol per week. He reports that he does not use drugs. ALLERGIES   No Known Allergies    CURRENT MEDICATIONS      Current Outpatient Medications:     Etanercept (ENBREL SURECLICK) 50 MG/ML SOAJ, Inject 50 mg into the skin once a week, Disp: 4 Syringe, Rfl: 5    sulfaSALAzine (AZULFIDINE) 500 MG tablet, Take 2 tablets by mouth 2 times daily, Disp: 120 tablet, Rfl: 0    gabapentin (NEURONTIN) 300 MG capsule, Take 1 capsule by mouth nightly for 3 days, THEN 1 capsule 2 times daily for 30 days. Intended supply: 90 days. , Disp: 63 capsule, Rfl: 0    cetirizine (ZYRTEC ALLERGY) 10 MG tablet, Take 10 mg by mouth daily, Disp: , Rfl:     tamsulosin (FLOMAX) 0.4 MG capsule, Take 0.4 mg by mouth daily, Disp: , Rfl:     spironolactone (ALDACTONE) 25 MG tablet, Take 25 mg by mouth daily, Disp: , Rfl:     pantoprazole (PROTONIX) 40 MG tablet, Take 40 mg by mouth daily. , Disp: , Rfl:     amitriptyline (ELAVIL) 25 MG tablet, Take 25 mg by mouth nightly.  , Disp: , Rfl:     lisinopril-hydrochlorothiazide (PRINZIDE;ZESTORETIC) 20-12.5 MG per tablet, Take 1 tablet by mouth 2 times daily. , Disp: , Rfl:     metoprolol (TOPROL-XL) 50 MG XL tablet, Take 50 mg by mouth daily. , Disp: , Rfl:     aspirin 81 MG EC tablet, Take 81 mg by mouth daily. , Disp: , Rfl:     naproxen (NAPROSYN) 500 MG tablet, Take 500 mg by mouth 2 times daily (with meals).   , Disp: , Rfl:     PHYSICAL EXAMINATION / OBJECTIVE     Objective:  /64 (Site: Left Upper Arm, Position: Sitting, Cuff Size: Large Adult)   Pulse 63   Temp 97.6 °F (36.4 °C) (Temporal)   Ht 6' 1\" (1.854 m)   Wt 258 lb 12.8 oz (117.4 kg)   SpO2 96%   BMI 34.14 kg/m²     General Appearance: General appearance:  AAO x 3 ,  well-developed and well nourished  Head: normocephalic and atraumatic  Eyes: No gross abnormalities. , PERRL,  Sclera nonicteric  ENT:  MMM, NON-tender, ears w/o deformities  Neck: supple ,  non tender   Lymph: no cervical nodes and no supraclavicular nodes  Pulmonary/Chest: CTA bilateral ,  normal air movement, no respiratory distress  Cardiovascular: normal rate, normal S1 and S2, NO murmur, rub, gallop. Edema   : Deferred   Abd/GI: Deferred   Neurologic:  speech normal,   Skin:  + faint small erythematous plaques along the bilateral lower extremities. Extremities: no cyanosis and no clubbing ,   Musculoskeletal:    Upper extremities:    SHOULDERS, ELBOWS NT, NS  WRISTS  NT, NS   HANDS/FINGERS tender MCPs Right 2,     Lower extremities:    HIPS NT, NS               KNEES NT, NS              ANKLES NT. NS            FEET : tender MTPs, metatarsal , mild sole  . Bunion bilat. Spine:  tener lumbar spine, sacral spine. KEY:  Tender :  T  Swelling: S  Deformities: D  Non-tender : NT  No swelling: NS       RAPID 3:   9/3/2020 --- RAPID 3:  +  +  =        Remission: <3  Low Disease Activity: <6  Moderate Disease Activity: >=6 and <=12  High Disease Activity: >12     LABS      CBC  Lab Results   Component Value Date    WBC 4.3 08/27/2020    WBC 6.1 06/08/2020    WBC 4.6 05/01/2020    RBC 4.12 08/27/2020    HGB 12.2 08/27/2020    HGB 13.2 06/08/2020    HGB 14.2 05/01/2020    HCT 38.3 08/27/2020    MCV 93.0 08/27/2020     08/27/2020     06/08/2020     05/01/2020       CMP  Lab Results   Component Value Date    CALCIUM 9.3 08/27/2020    LABALBU 4.2 08/27/2020    PROT 7.0 08/27/2020  08/27/2020    K 4.0 08/27/2020    CO2 25 08/27/2020     08/27/2020    BUN 22 08/27/2020    CREATININE 1.1 08/27/2020    CREATININE 1.3 06/29/2020    CREATININE 1.4 06/08/2020    ALKPHOS 55 08/27/2020    ALT 18 08/27/2020    ALT 23 06/08/2020    ALT 22 05/01/2020    AST 15 08/27/2020    AST 19 06/08/2020    AST 17 05/01/2020       HgBA1c: No components found for: HGBA1C    No results found for: VITD25    Lab Results   Component Value Date    ANASCRN Detected (A) 10/16/2019     Lab Results   Component Value Date    SSA SEE BELOW 10/16/2019     Lab Results   Component Value Date    SSB 0 10/16/2019     No results found for: ANTI-SMITH  No results found for: DSDNAAB   No results found for: ANTIRNP  No results found for: C3, C4  Lab Results   Component Value Date    CCPAB 5 10/16/2019     Lab Results   Component Value Date    RF 15 (H) 10/16/2019       No components found for: CANCASCRN, APANCASCRN  Lab Results   Component Value Date    SEDRATE 14 (H) 08/27/2020     Lab Results   Component Value Date    CRP 0.09 08/27/2020         RADIOLOGY / PROCEDURES:         ASSESSMENT/PLAN    Assessment   Plan     1. Axial spondyloarthritis (HCC)        Undiff axial spondyloarthropathy  - hand pain w/ CTS sx's, foot pain s/p 4 surgeries w/ continued pain, back pain since 50's w/ associated radicular sx's. Reported joint swelling left toes and fingers bilat. AM stiffness > 60 mins. + fatigue, + dry eyes, nail pitting bilateral hands, onycholysis toenails. Exam w/ + joint swelling MCPs and left knee warmth  - prior tx: prednisone (no relief)               - mobic 15 mg daily PRN    -- d/c enbrel 50 mg weekly --- feb to sept 2020.   -- Start cosentyx 150mg q wk x 5 then 150mg q 4 weeks. -- we discussed the risks associated with this index including but not limited to infections, malignancy, allergic reaction, nasopharyngitis, diarrhea, inflammatory bowel disease flares,  and upper respiratory tract infections.  -- baseline TB testing needed prior to starting.   -- sulfasalazine 1000 mg BID- restarted this morning      Chronic midline low back pain with bilateral sciatica  - pt w/ pseudoclaudication sx's but intact DTR's and muscle strength. + fall 15 feet 26 years ago w/ 3 broken. PRIOR tx:  gabapentin 300 mg BID (no relief)   - start elavil 25 mg qhs.      Medication monitoring               - TB gold negative (1/23/2020)               - CBC, CMP, sed rate, CRP q 8 x 1--- then q 12 weeks.      Health Maintenance                - prevnar 13 (4/30/2020)        No follow-ups on file. Electronically signed by Kj Soler DO on 9/3/2020 at 3:18 PM    New Prescriptions    No medications on file       Thank you for allowing me to participate in the care of this patient. Please call if there are any questions.

## 2020-09-04 ENCOUNTER — OFFICE VISIT (OUTPATIENT)
Dept: PHYSICAL MEDICINE AND REHAB | Age: 60
End: 2020-09-04
Payer: COMMERCIAL

## 2020-09-04 VITALS
DIASTOLIC BLOOD PRESSURE: 64 MMHG | BODY MASS INDEX: 34.19 KG/M2 | HEIGHT: 73 IN | WEIGHT: 258 LBS | SYSTOLIC BLOOD PRESSURE: 100 MMHG

## 2020-09-04 PROCEDURE — 99205 OFFICE O/P NEW HI 60 MIN: CPT | Performed by: ANESTHESIOLOGY

## 2020-09-04 RX ORDER — DULOXETIN HYDROCHLORIDE 30 MG/1
30 CAPSULE, DELAYED RELEASE ORAL DAILY
Qty: 30 CAPSULE | Refills: 2 | Status: SHIPPED | OUTPATIENT
Start: 2020-09-04 | End: 2020-09-30 | Stop reason: DRUGHIGH

## 2020-09-04 NOTE — PROGRESS NOTES
Chronic Pain Clinic Note     Encounter Date: 9/4/20    Subjective:   Chief Complaint:   Chief Complaint   Patient presents with    New Patient       History of Present Illness:   Haile Ayala is a 63 y.o. male seen in the Pain Clinic initially on 09/04/20 upon request from William Still DO (Rheumatology) for his history of chronic low back pain. He has history of axial spondyloarthropathy. He has multiple pain complaints. He states that his feet are most bothersome to him and that this is been going on for greater than 15 years. In addition, he states that his low back and bilateral leg pain has progressively getting worse over 5 years. He does not have any recent trauma/fall/inciting event however he did sustain a fall from height greater than 15 feet over 20 years ago. He describes low back pain as midline with radiation towards both flanks. The pain does radiate down both legs on the posterior aspect of the thighs and into the posterior aspect of the calves and plantar aspects of the feet. He has associated numbness/tingling in both legs and feet diffusely throughout. He states that he feels like his legs have lost some strength and has difficulty with balance. In addition he states it is becoming increasingly difficult to walk further than a mile due to the pain in his feet and low back. His pain is exacerbated practically when he sits down to rest and at nighttime. He does have difficulty with sleep. He states that there is not much that does improve his pain, and he feels like the medications he is currently taking are not providing much relief. He denies any saddle anesthesia or bowel/bladder incontinence. Of note, he has an MRI L-spine performed in 2013 with multilevel degenerative changes with associated multilevel spinal stenosis, neuroforaminal stenosis and facet arthropathy. He states that he had previous lumbar epidural injections well over 5 years ago with no response.   He diffusely, but most impressively at L5-S1 where there is also         significantly narrowed appearance of the disc space.  Comparing to the         radiographs, this has slightly progressed since the prior.              The conus medullaris and the cauda equina show no evidence of intrinsic         abnormalities.               At L1-2, the canal is grossly patent as are the foramina.  There are some         minimal to mild facet degenerative changes.  The conus medullaris is         located at the superior endplate of L2.                   Page 1 of 2              Print date/time:2/25/2013 10:00 AM                         [address]          Patient Name: Karlie Chang    Patient Type: [facility]  OP           MRN:  [id number]    Buddy Wrightx Sake                     Account Number: [de-identified]          Ord Phys: Qian Currydi      Date of Birth:  1960          M.D.                              Pt Loc:  [facility]                                     R a d i o l o g y   R e p o r t              Accession Number:  Procedure Name:             Exam Date/Time:         [id number]      MRI Spinal Lumbar Canal     2/22/2013 11:45:00 AM                            Cont WO Contr              L2-3 canal is minimally affected by bulging disc material with a right         sided prominence.  Some mild facet degenerative changes also present.         There is inferior foraminal stenosis on the right, and no significant         stenosis of the foramina on the left.  Degree of stenosis of the right         foramen is very minimal.              At L3-4, bulging disc material and moderate facet degenerative changes are         demonstrated, resulting in mild stenosis of the central canal.  There is         right more than left subarticular stenosis, mild as well.  Some minimal         foraminal narrowing on the right more than left.              At L4-5, there are moderate facet degenerative changes bilaterally. Neal Grandchild   is some mild bulging disc material. Constancia Border is only minimal to mild stenosis         of the canal, less so the foramina.              L5-S1 level shows significant stenosis of the right foramen, related to         disc osteophyte complex, and possibly superimposed posterolateral disc         herniation. Constancia Border is only minimal canal stenosis.  Only minimal, if any,         foraminal stenosis on the left.  Some mild subarticular stenosis on both         sides, right more than left, also, but there is preserved perineural fat         adjacent to the intercanal S1 nerve roots on both sides.              Surrounding tissues show no concerning findings.              IMPRESSION:         1.  TRANSITIONAL ANATOMY; PLEASE CORRELATE WITH CLINICAL EXAM.         2.  SIGNIFICANT STENOSIS OF THE RIGHT LATERAL CANAL AND FORAMEN AT L5-S1         RELATED TO POSTEROLATERAL DISC PROTRUSION, POSSIBLY DISC HERNIATION, THOUGH         DIFFICULT TO CONFIRM. [facility] LESS IMPRESSIVE STENOTIC FEATURES ELSEWHERE.           Past Medical History:   Diagnosis Date    GERD (gastroesophageal reflux disease)     Hypertension     Neuropathy     Osteoarthritis        Past Surgical History:   Procedure Laterality Date    COLONOSCOPY  12/2019    FOOT SURGERY  2009    KNEE SURGERY      1997, 2008 (right) 2007 (Left)    LIPOMA RESECTION  2010    Chest    ROTATOR CUFF REPAIR Left 08/2019       Family History   Problem Relation Age of Onset    Cancer Mother         brain    High Blood Pressure Father     High Blood Pressure Brother     Diabetes Brother        Social History     Socioeconomic History    Marital status:      Spouse name: Not on file    Number of children: Not on file    Years of education: Not on file    Highest education level: Not on file   Occupational History    Not on file   Social Needs    Financial resource strain: Not on file    Food insecurity     Worry: Not on file     Inability: Not on file   [employer] needs     Medical: Not on file     Non-medical: Not on file   Tobacco Use    Smoking status: Never Smoker    Smokeless tobacco: Never Used   Substance and Sexual Activity    Alcohol use: Yes     Alcohol/week: 12.0 standard drinks     Types: 12 Cans of beer per week    Drug use: Never    Sexual activity: Not on file   Lifestyle    Physical activity     Days per week: Not on file     Minutes per session: Not on file    Stress: Not on file   Relationships    Social connections     Talks on phone: Not on file     Gets together: Not on file     Attends Orthodoxy service: Not on file     Active member of club or organization: Not on file     Attends meetings of clubs or organizations: Not on file     Relationship status: Not on file    Intimate partner violence     Fear of current or ex partner: Not on file     Emotionally abused: Not on file     Physically abused: Not on file     Forced sexual activity: Not on file   Other Topics Concern    Not on file   Social History Narrative    Not on file       Medications & Allergies:   Current Outpatient Medications   Medication Instructions    aspirin 81 mg, DAILY    cetirizine (ZYRTEC ALLERGY) 10 mg, Oral, DAILY    DULoxetine (CYMBALTA) 30 mg, Oral, DAILY    lisinopril-hydrochlorothiazide (PRINZIDE;ZESTORETIC) 20-12.5 MG per tablet 1 tablet, 2 TIMES DAILY    metoprolol succinate (TOPROL XL) 50 mg, DAILY    naproxen (NAPROSYN) 500 mg, 2 TIMES DAILY WITH MEALS    pantoprazole (PROTONIX) 40 mg, DAILY    secukinumab, 300 MG Dose, (COSENTYX SENSOREADY, 300 MG,) 150 MG/ML SOAJ Inject 150mg subcu weekly for 5 weeks then 4 weeks start start injecting 150mg subcu every 4 weeks.     spironolactone (ALDACTONE) 25 mg, Oral, DAILY    sulfaSALAzine (AZULFIDINE) 1,000 mg, Oral, 2 TIMES DAILY    tamsulosin (FLOMAX) 0.4 mg, Oral, DAILY       No Known Allergies    Review of Systems:   Constitutional: negative   Eyes: negative   Ears, nose, mouth, sciatica     4. Neuropathic pain     5. Polyarthralgia         Renny Morris is a 63 y.o.male presenting to the pain clinic for evaluation of chronic low back, bilateral leg, and bilateral feet pain. Due to his axial spondyloarthropathy he does have advanced arthritis in his lumbar spine as evident on x-ray. He does have a large skill skeletal and neuropathic component to his pain. He does have a clinical history and physical examination consistent with lumbar facet mediated pain, and we will pursue bilateral L4/L5 and L5/S1 diagnostic medial branch blocks. We will await the results for the new lumbar MRI with his history suggestive of some claudication symptoms however this should not impede with his medial branch blocks. In addition, we discussed medication management. Due to his musculoskeletal and neuropathic pain, we will start him on low-dose Cymbalta 30 mg a day with plans to titrate up. I have stopped his Elavil due to interaction with Cymbalta causing serotonin syndrome. Plan: The following treatment recommendations and plan were discussed in detail with Renny Morris. Imaging:   I have reviewed patients imaging of XR L-spine and MRI L-spine (2013) and results were discussed with patient today. Analgesics:   Patient is taking Naproxen. Patient is advised to take as prescribed and not take on an empty stomach. Adjuvants: With associated musculoskeletal neuropathic pain, we will begin a trial of low-dose Cymbalta at 30 mg a day. I have discontinued his Elavil due to risk for serotonin syndrome. We will plan on slowly titrating Cymbalta up to the 60 mg a day. Interventions: In presence of axial low back and with physical exam consistent for lumbar  facetal pain the option of local anesthetic medial branch blocks at bilateral L4, L5, and S1 was chosen. The risks and benefits were discussed in detail with the patient.  Patient wants to proceed with the injection. Anticoagulation/NPO Recommendations:   Please hold Naproxen (Aleve) x 7 days prior to day of procedure. Patient does NOT NEED to be NPO for procedure. Multidisciplinary Pain Management:   In the presence of complex, chronic, and multi-factorial pain, the importance of a multidisciplinary approach to pain management in the patients management regimen was emphasized and discussed in great detail. PHYSICAL THERAPY: Patient is advised to see a physical therapist for gentle stretching exercises and conditioning exercises for management of pain.      Referrals:  None    Prescriptions Written This Visit:   Cymbalta (Duloxetine)    Follow-up: For bilateral medial branch blocks, then 1 week follow-up after procedure    Marco Antonio Mistry, DO  Interventional Pain Management/PM&R   [place]

## 2020-09-04 NOTE — PATIENT INSTRUCTIONS
The following treatment recommendations and plan were discussed in detail with Aakash Left. Imaging:   I have reviewed patients imaging of XR L-spine and MRI L-spine (2013) and results were discussed with patient today. Analgesics:   Patient is taking Naproxen. Patient is advised to take as prescribed and not take on an empty stomach. Adjuvants: We will begin Cymbalta 30 mg a day and discontinue the Elavil. Interventions: In presence of axial low back and with physical exam consistent for lumbar  facetal pain the option of local anesthetic medial branch blocks at bilateral L4, L5, and S1 was chosen. The risks and benefits were discussed in detail with the patient. Patient wants to proceed with the injection. Anticoagulation/NPO Recommendations:   Please hold Naproxen (Aleve) x 7 days prior to day of procedure. Patient does NOT NEED to be NPO for procedure. Multidisciplinary Pain Management:   In the presence of complex, chronic, and multi-factorial pain, the importance of a multidisciplinary approach to pain management in the patients management regimen was emphasized and discussed in great detail. PHYSICAL THERAPY: Patient is advised to see a physical therapist for gentle stretching exercises and conditioning exercises for management of pain.      Referrals:  None    Prescriptions Written This Visit:   Cymbalta (Duloxetine)    Follow-up: For bilateral medial branch blocks, then 1 week follow-up after procedure

## 2020-09-08 ENCOUNTER — TELEPHONE (OUTPATIENT)
Dept: PHYSICAL MEDICINE AND REHAB | Age: 60
End: 2020-09-08

## 2020-09-08 NOTE — TELEPHONE ENCOUNTER
Scheduled procedure and follow up. Went over procedure instructions as in Dr. Michelle Orellana note. Explained to pt he will need a  for procedure.

## 2020-09-10 ENCOUNTER — TELEPHONE (OUTPATIENT)
Dept: RHEUMATOLOGY | Age: 60
End: 2020-09-10

## 2020-09-16 ENCOUNTER — PATIENT MESSAGE (OUTPATIENT)
Dept: RHEUMATOLOGY | Age: 60
End: 2020-09-16

## 2020-09-17 ENCOUNTER — PATIENT MESSAGE (OUTPATIENT)
Dept: RHEUMATOLOGY | Age: 60
End: 2020-09-17

## 2020-09-18 RX ORDER — SECUKINUMAB 150 MG/ML
INJECTION SUBCUTANEOUS
Qty: 5 ML | Refills: 1 | Status: SHIPPED | OUTPATIENT
Start: 2020-09-18 | End: 2021-01-07 | Stop reason: SDUPTHER

## 2020-09-18 NOTE — TELEPHONE ENCOUNTER
From: Ravi Gone  To: Wang Serra DO  Sent: 9/17/2020 3:51 PM EDT  Subject: Prescription Question    [facility] in [place] cant get my Cosentyx

## 2020-09-22 ENCOUNTER — HOSPITAL ENCOUNTER (OUTPATIENT)
Age: 60
Setting detail: OUTPATIENT SURGERY
Discharge: HOME | End: 2020-09-22
Attending: ANESTHESIOLOGY | Admitting: ANESTHESIOLOGY
Payer: COMMERCIAL

## 2020-09-22 ENCOUNTER — APPOINTMENT (OUTPATIENT)
Dept: GENERAL RADIOLOGY | Age: 60
End: 2020-09-22
Attending: ANESTHESIOLOGY
Payer: COMMERCIAL

## 2020-09-22 ENCOUNTER — APPOINTMENT (OUTPATIENT)
Dept: MRI IMAGING | Age: 60
End: 2020-09-22
Payer: COMMERCIAL

## 2020-09-22 ENCOUNTER — HOSPITAL ENCOUNTER (OUTPATIENT)
Dept: MRI IMAGING | Age: 60
Discharge: HOME | End: 2020-09-22
Attending: ANESTHESIOLOGY
Payer: COMMERCIAL

## 2020-09-22 VITALS
TEMPERATURE: 97.1 F | HEART RATE: 51 BPM | SYSTOLIC BLOOD PRESSURE: 119 MMHG | DIASTOLIC BLOOD PRESSURE: 58 MMHG | HEIGHT: 73 IN | RESPIRATION RATE: 16 BRPM | OXYGEN SATURATION: 97 % | BODY MASS INDEX: 33.9 KG/M2 | WEIGHT: 255.8 LBS

## 2020-09-22 PROCEDURE — 3209999900 FLUORO FOR SURGICAL PROCEDURES

## 2020-09-22 PROCEDURE — 3600000055 HC PAIN LEVEL 3 ADDL 15 MIN: Performed by: ANESTHESIOLOGY

## 2020-09-22 PROCEDURE — 2709999900 HC NON-CHARGEABLE SUPPLY: Performed by: ANESTHESIOLOGY

## 2020-09-22 PROCEDURE — 72148 MRI LUMBAR SPINE W/O DYE: CPT

## 2020-09-22 PROCEDURE — 64493 INJ PARAVERT F JNT L/S 1 LEV: CPT | Performed by: ANESTHESIOLOGY

## 2020-09-22 PROCEDURE — 7100000010 HC PHASE II RECOVERY - FIRST 15 MIN: Performed by: ANESTHESIOLOGY

## 2020-09-22 PROCEDURE — 64494 INJ PARAVERT F JNT L/S 2 LEV: CPT | Performed by: ANESTHESIOLOGY

## 2020-09-22 PROCEDURE — 3600000054 HC PAIN LEVEL 3 BASE: Performed by: ANESTHESIOLOGY

## 2020-09-22 PROCEDURE — 2500000003 HC RX 250 WO HCPCS: Performed by: ANESTHESIOLOGY

## 2020-09-22 RX ORDER — LIDOCAINE HYDROCHLORIDE 10 MG/ML
INJECTION, SOLUTION EPIDURAL; INFILTRATION; INTRACAUDAL; PERINEURAL PRN
Status: DISCONTINUED | OUTPATIENT
Start: 2020-09-22 | End: 2020-09-22 | Stop reason: ALTCHOICE

## 2020-09-22 RX ORDER — BUPIVACAINE HYDROCHLORIDE 5 MG/ML
INJECTION, SOLUTION PERINEURAL PRN
Status: DISCONTINUED | OUTPATIENT
Start: 2020-09-22 | End: 2020-09-22 | Stop reason: ALTCHOICE

## 2020-09-22 ASSESSMENT — PAIN SCALES - GENERAL: PAINLEVEL_OUTOF10: 0

## 2020-09-22 ASSESSMENT — PAIN - FUNCTIONAL ASSESSMENT: PAIN_FUNCTIONAL_ASSESSMENT: 0-10

## 2020-09-22 ASSESSMENT — PAIN DESCRIPTION - DESCRIPTORS: DESCRIPTORS: ACHING;NUMBNESS;TINGLING

## 2020-09-22 NOTE — PROCEDURES
Pre-operative Diagnosis: Bilateral lumbar facet pain     Post-operative Diagnosis: Bilateral lumbar facet pain     Procedure: Bilateral lumbar medial branch blocks targeting facet joints of L4/L5 and L5/S1     Procedure Description:  After consent was obtained, the patient was placed in the prone position having pressure points appropriately padded. The lower back was prepped with chloraprep and draped in a sterile fashion. 0.5 cc of 1 % lidocaine was used for local anesthesia of the skin and superficial subcutaneous tissues. Three 22-gauge 3.5-inch needles were advanced under fluoroscopy in an AP view with caudad angulation at the sacral ala and at the junction of the right superior articular process and the transverse process of the L4 and L5 vertebrae. There was no paresthesias, heme, or CSF obtained. Needle placement was confirmed using AP and oblique views. Then 0.5 cc of 0.5% bupivacaine was injected at the site without complications. The procedure was repeated at L4, L5, and sacral ala on the left side.  The patient tolerated the procedure well, transported to the recovery room, and observed for 15 minutes prior to being discharged in ambulatory fashion.     Procedural Complications: None       Davonte Sullivan DO  Interventional Pain Management/PM&R   [place]

## 2020-09-22 NOTE — PROGRESS NOTES
0811-Patient to Phase II via cart. Report received from [facility]. Patient awake and alert. Vitals obtained and stable. Respirations even and unlabored on room air. Patient denies pain, nausea, numbness and tingling. Patient able to move all extremities. No drainage noted at injection sites. Patient instructed to stay in bed. Instructed on call light use. 0815-discharge instructions reviewed. Verbalized understanding. 0820-Patient discharged in stable condition. All belongings given to patient. Patient ambulated to car with assistance from RN. Patient tolerated well.

## 2020-09-22 NOTE — H&P
Today, patient presents for planned bilateral L4, L4, S1 diagnostic medial branch blocks. This note is reflective of the patient's previous visit for evaluation. We will proceed with today's planned procedure. Since patient's last visit for evaluation, there have been no interval changes in medical history. Patient has no new numbness, weakness, or focal neurological deficit since evaluation. Patient has no contraindications to injection (no anticoagulation, recent antibiotic intake for active infections, allergies to latex / contrast dye / steroid medications), and has a  present. NPO necessity has been assessed and accepted based on procedure complexity. The risks and benefits of the procedure have been explained including but are not limited to infection, bleeding, paralysis, immediate post procedure weakness, and dizziness; the patient acknowledges understanding and desires to proceed with the procedure. Consented for same procedure. All other questions and concerns were addressed at bedside. See procedure note for full details. Vitals:    09/22/20 0811   BP: (!) 119/58   Pulse: 51   Resp: 16   Temp: 97.1 °F (36.2 °C)   SpO2: 97%        Diagnosis Orders   1. Lumbar facet joint pain  CHG FLUOR NEEDLE/CATH SPINE/PARASPINAL DX/THER ADDON    DE INJ DX/THER AGNT PARAVERT FACET JOINT, LUMBAR/SAC, 1ST LEVEL    DE INJ DX/THER AGNT PARAVERT FACET JOINT, LUMBAR/SAC, ADD LEVEL   2. Other chronic pain     3. Chronic bilateral low back pain with bilateral sciatica     4. Neuropathic pain     5. Polyarthralgia         Follow up: 1 week    Post procedure Instructions: The patient was advised not to drive during the day of the procedure and not to engage in any significant decision making. The patient was also advised to be cautious with walking/activity for 24 hours following today's visit and asked not to engage in over-exertion. After this time, it is ok to resume pre-procedure level of activity.  Patient advised to apply ice to site of injection in situations of pain and discomfort. Patient advised to not submerge site of injection during bath or pool activities for approximately 48 hours post-procedure. Patient attested to understanding post procedure directions / restrictions. All other questions and concerns addressed before patient discharge in ambulatory fashion. Chronic Pain Clinic Note     Encounter Date: 9/4/20    Subjective:   Chief Complaint:   No chief complaint on file. History of Present Illness:   Haile Ayala is a 63 y.o. male seen in the Pain Clinic initially on 09/22/20 upon request from William Still DO (Rheumatology) for his history of chronic low back pain. He has history of axial spondyloarthropathy. He has multiple pain complaints. He states that his feet are most bothersome to him and that this is been going on for greater than 15 years. In addition, he states that his low back and bilateral leg pain has progressively getting worse over 5 years. He does not have any recent trauma/fall/inciting event however he did sustain a fall from height greater than 15 feet over 20 years ago. He describes low back pain as midline with radiation towards both flanks. The pain does radiate down both legs on the posterior aspect of the thighs and into the posterior aspect of the calves and plantar aspects of the feet. He has associated numbness/tingling in both legs and feet diffusely throughout. He states that he feels like his legs have lost some strength and has difficulty with balance. In addition he states it is becoming increasingly difficult to walk further than a mile due to the pain in his feet and low back. His pain is exacerbated practically when he sits down to rest and at nighttime. He does have difficulty with sleep. He states that there is not much that does improve his pain, and he feels like the medications he is currently taking are not providing much relief.   He denies any saddle anesthesia or bowel/bladder incontinence. Of note, he has an MRI L-spine performed in 2013 with multilevel degenerative changes with associated multilevel spinal stenosis, neuroforaminal stenosis and facet arthropathy. He states that he had previous lumbar epidural injections well over 5 years ago with no response. He states that he has not been enrolled in physical therapy recently but he does endorse doing daily stretching prior to work. When discussing his leg numbness/tingling, he denies any history of diabetes, vitamin deficiencies particularly B12, excessive alcohol usage in his lifetime. He denies any family history that is significant for any neuropathic pain or neuropathy. History of Intervention:   Surgery: No previous lumbar surgeries  Injections: Previous lumbar epidurals? - did not get much relief    Current Treatment Medications:   Elavil 25 mg QHS  cosentyx inj - awaiting insurance approval  Sulfasalazine 1000 mg BID  Naproxen 500 mg PRN    Historical Treatment Medications:   Gabapentin 300 mg BID    Imaging:  XR L-spine (8/27/20)  FINDINGS: No acute fracture or subluxation. Degenerative disc disease with disc space narrowing at virtually all levels. The disc space narrowing has progressed at L4-5 and L5 1 since the prior exam. There are bridging osteophytes at L3-4, L4-5, and to a     lesser degree at L2-3. Pedicles are intact. Probable discal calcification at T10-11. Bridging osteophyte at T12-L1              Impression    Diffuse degenerative changes throughout the lumbar spine.  Disc space narrowing has progressed at L4-5 and L5-S1 since the prior exam      MRI L-spine (2/22/2013)    FINDINGS:  Once again there are transitional anatomy features demonstrated         with a complete disc space at S1-2.              Alignment of the osseous framework is similar to the previous radiographs.         Marrow signals are mildly heterogeneous, non-specific overall, but there         are areas of likely interosseous hemangiomas in the sacrum and in the L1         vertebral body.  There are also some mild Modic Type II endplate changes         along the anterior endplates and less so along the posterior endplates at    Kim Jane   several areas.  These are of minimal degree.  Desiccated disc material         demonstrated diffusely, but most impressively at L5-S1 where there is also         significantly narrowed appearance of the disc space.  Comparing to the         radiographs, this has slightly progressed since the prior.              The conus medullaris and the cauda equina show no evidence of intrinsic         abnormalities.               At L1-2, the canal is grossly patent as are the foramina.  There are some         minimal to mild facet degenerative changes.  The conus medullaris is         located at the superior endplate of L2.                   Page 1 of 2              Print date/time:2/25/2013 10:00 AM                         [address]          Patient Name: Jennifer Ortiz    Patient Type: [facility]  OP           MRN:  [id number]    Kim Jane    GenderLolita Ladarmida                     Account Number: [de-identified]          Ord Phys: Carlita Syd      Date of Birth:  1960          MMELLISSA                              Pt Loc:  [facility] Outpt                                     R a d i o l o g y   R e p o r t              Accession Number:  Procedure Name:             Exam Date/Time:         [id number]      MRI Spinal Lumbar Canal     2/22/2013 11:45:00 AM                            Cont WO Contr              L2-3 canal is minimally affected by bulging disc material with a right         sided prominence.  Some mild facet degenerative changes also present.         There is inferior foraminal stenosis on the right, and no significant         stenosis of the foramina on the left.  Degree of stenosis of the right         foramen is very minimal.              At L3-4, bulging disc material and moderate facet degenerative changes are         demonstrated, resulting in mild stenosis of the central canal.  There is         right more than left subarticular stenosis, mild as well.  Some minimal         foraminal narrowing on the right more than left.              At L4-5, there are moderate facet degenerative changes bilaterally.  There         is some mild bulging disc material. Bethena Lighter is only minimal to mild stenosis         of the canal, less so the foramina.              L5-S1 level shows significant stenosis of the right foramen, related to         disc osteophyte complex, and possibly superimposed posterolateral disc         herniation.  There is only minimal canal stenosis.  Only minimal, if any,         foraminal stenosis on the left.  Some mild subarticular stenosis on both         sides, right more than left, also, but there is preserved perineural fat         adjacent to the intercanal S1 nerve roots on both sides.              Surrounding tissues show no concerning findings.              IMPRESSION:         1.  TRANSITIONAL ANATOMY; PLEASE CORRELATE WITH CLINICAL EXAM.         2.  SIGNIFICANT STENOSIS OF THE RIGHT LATERAL CANAL AND FORAMEN AT L5-S1         RELATED TO POSTEROLATERAL DISC PROTRUSION, POSSIBLY DISC HERNIATION, THOUGH         DIFFICULT TO CONFIRM. [facility] LESS IMPRESSIVE STENOTIC FEATURES ELSEWHERE.           Past Medical History:   Diagnosis Date    GERD (gastroesophageal reflux disease)     Hypertension     Neuropathy     Osteoarthritis        Past Surgical History:   Procedure Laterality Date    COLONOSCOPY  12/2019    FOOT SURGERY  2009    KNEE SURGERY      1997, 2008 (right) 2007 (Left)    LIPOMA RESECTION  2010    Chest    ROTATOR CUFF REPAIR Left 08/2019       Family History   Problem Relation Age of Onset    Cancer Mother         brain    High Blood Pressure Father     High Blood Pressure Brother     Diabetes Brother        Social History     Socioeconomic History    Marital status:      Spouse name: Not on file    Number of children: Not on file    Years of education: Not on file    Highest education level: Not on file   Occupational History    Not on file   Social Needs    Financial resource strain: Not on file    Food insecurity     Worry: Not on file     Inability: Not on file    Transportation needs     Medical: Not on file     Non-medical: Not on file   Tobacco Use    Smoking status: Never Smoker    Smokeless tobacco: Never Used   Substance and Sexual Activity    Alcohol use:  Yes     Alcohol/week: 12.0 standard drinks     Types: 12 Cans of beer per week    Drug use: Never    Sexual activity: Not on file   Lifestyle    Physical activity     Days per week: Not on file     Minutes per session: Not on file    Stress: Not on file   Relationships    Social connections     Talks on phone: Not on file     Gets together: Not on file     Attends Shinto service: Not on file     Active member of club or organization: Not on file     Attends meetings of clubs or organizations: Not on file     Relationship status: Not on file    Intimate partner violence     Fear of current or ex partner: Not on file     Emotionally abused: Not on file     Physically abused: Not on file     Forced sexual activity: Not on file   Other Topics Concern    Not on file   Social History Narrative    Not on file       Medications & Allergies:   Current Outpatient Medications   Medication Instructions    aspirin 81 mg, DAILY    cetirizine (ZYRTEC ALLERGY) 10 mg, Oral, DAILY    DULoxetine (CYMBALTA) 30 mg, Oral, DAILY    lisinopril-hydrochlorothiazide (PRINZIDE;ZESTORETIC) 20-12.5 MG per tablet 1 tablet, 2 TIMES DAILY    metoprolol succinate (TOPROL XL) 50 mg, DAILY    naproxen (NAPROSYN) 500 mg, 2 TIMES DAILY WITH MEALS    pantoprazole (PROTONIX) 40 mg, DAILY    secukinumab, 300 MG Dose, (COSENTYX SENSOREADY, 300 MG,) 150 MG/ML SOAJ Inject 150mg subcu weekly for 5 weeks then 4 weeks start start injecting 150mg subcu every 4 weeks.  spironolactone (ALDACTONE) 25 mg, Oral, DAILY    sulfaSALAzine (AZULFIDINE) 1,000 mg, Oral, 2 TIMES DAILY    tamsulosin (FLOMAX) 0.4 mg, Oral, DAILY       No Known Allergies    Review of Systems:   Constitutional: negative   Eyes: negative   Ears, nose, mouth, throat, and face: negative   Respiratory: negative   Cardiovascular: negative   Gastrointestinal: negative   Genitourinary: negative for bowel/bladder incontinence   Hematologic/lymphatic: negative   Musculoskeletal: back pain, multiple joint pain  Neurological: numbness/tingling in legs/feet, leg weakness and cramping  Behavioral/Psych: negative for anxiety/depression   Allergic/Immunologic: negative     Objective:   BP (!) 148/75   Pulse 64   Temp 97.3 °F (36.3 °C) (Temporal)   Resp 16   Ht 6' 1\" (1.854 m)   Wt 255 lb 12.8 oz (116 kg)   SpO2 98%   BMI 33.75 kg/m²     Constitutional: Pleasant, no acute distress   Head: Normocephalic, atraumatic   Eyes: Conjunctivae normal   Neck: Supple, symmetrical   Lungs: Normal respiratory effort, non-labored breathing   Cardiovascular: Limbs warm and well perfused   Abdomen: Non-protruded   Musculoskeletal: Muscle bulk symmetric, no atrophy, no gross deformities   · Lumbar Spine: ROM restricted with extension Lumbar paraspinals tender bilaterally. SLR neg bilaterally. MIKAL neg bilaterally. GAENSLEN neg bilaterally. Positive facet loading bilaterally. Bilateral SI joints non-tender to palpation. Bilateral greater trochanters non-tender to palpation. Neurological: Cranial nerves II-XII grossly intact. · Gait - Slightly antalgic gait. Ambulates without assist device.    · Motor: 5/5 muscle strength in bilateral hip flexion, knee flexion, knee extension, dorsiflexion, and plantar flexion   · Sensory: LT sensation intact in lower limbs   · Reflexes: 2+ symmetrical in bilateral achilles, 2+ bilateral patellar, negative ankle clonus  Skin: No to risk for serotonin syndrome. We will plan on slowly titrating Cymbalta up to the 60 mg a day. Interventions: In presence of axial low back and with physical exam consistent for lumbar  facetal pain the option of local anesthetic medial branch blocks at bilateral L4, L5, and S1 was chosen. The risks and benefits were discussed in detail with the patient. Patient wants to proceed with the injection. Anticoagulation/NPO Recommendations:   Please hold Naproxen (Aleve) x 7 days prior to day of procedure. Patient does NOT NEED to be NPO for procedure. Multidisciplinary Pain Management:   In the presence of complex, chronic, and multi-factorial pain, the importance of a multidisciplinary approach to pain management in the patients management regimen was emphasized and discussed in great detail. PHYSICAL THERAPY: Patient is advised to see a physical therapist for gentle stretching exercises and conditioning exercises for management of pain.      Referrals:  None    Prescriptions Written This Visit:   Cymbalta (Duloxetine)    Follow-up: For bilateral medial branch blocks, then 1 week follow-up after procedure    Marco Antonio Austin, DO  Interventional Pain Management/PM&R   [place]

## 2020-09-30 ENCOUNTER — OFFICE VISIT (OUTPATIENT)
Dept: PHYSICAL MEDICINE AND REHAB | Age: 60
End: 2020-09-30
Payer: COMMERCIAL

## 2020-09-30 VITALS
HEART RATE: 70 BPM | SYSTOLIC BLOOD PRESSURE: 116 MMHG | WEIGHT: 255 LBS | DIASTOLIC BLOOD PRESSURE: 68 MMHG | BODY MASS INDEX: 33.8 KG/M2 | TEMPERATURE: 97.1 F | HEIGHT: 73 IN

## 2020-09-30 PROCEDURE — 99214 OFFICE O/P EST MOD 30 MIN: CPT | Performed by: ANESTHESIOLOGY

## 2020-09-30 RX ORDER — DULOXETIN HYDROCHLORIDE 60 MG/1
60 CAPSULE, DELAYED RELEASE ORAL DAILY
Qty: 30 CAPSULE | Refills: 2 | Status: SHIPPED | OUTPATIENT
Start: 2020-09-30 | End: 2020-12-22 | Stop reason: SDUPTHER

## 2020-09-30 NOTE — PROGRESS NOTES
[facility]  [address]  Dept: [phone]  Dept Fax: [phone]: [phone]    Visit Date: 9/30/2020    Functionality Assessment/Goals Worksheet     On a scale of 0 (Does not Interfere) to 10 (Completely Interferes)     1. Which number describes how during the past week pain has interfered with       the following:  A. General Activity:  6  B. Mood: 4  C. Walking Ability:  3  D. Normal Work (Includes both work outside the home and housework):  3  E. Relations with Other People:   2  F. Sleep:   7  G. Enjoyment of Life:   5    2. Patient Prefers to Take their Pain Medications:     [x]  On a regular basis   []  Only when necessary    []  Does not take pain medications    3. What are the Patient's Goals/Expectations for Visiting Pain Management?      [x]  Learn about my pain    []  Receive Medication   []  Physical Therapy     []  Treat Depression   []  Receive Injections    []  Treat Sleep   []  Deal with Anxiety and Stress   []  Treat Opoid Dependence/Addiction   []  Other:

## 2020-09-30 NOTE — PATIENT INSTRUCTIONS
The following treatment recommendations and plan were discussed in detail with Rossy Whitaker. Imaging:   I have reviewed patients imaging of XR L-spine and MRI L-spine (2013) and results were discussed with patient today. Analgesics:   Patient is taking Naproxen. Patient is advised to take as prescribed and not take on an empty stomach. Adjuvants: With associated musculoskeletal neuropathic pain, we will begin a trial of low-dose Cymbalta at 30 mg a day. I have discontinued his Elavil due to risk for serotonin syndrome. We will plan on slowly titrating Cymbalta up to the 60 mg a day. Interventions: In presence of axial low back and with physical exam consistent for lumbar  facetal pain and significant response to diagnostic lumbar medial branch blocks, confirmatory medial branch blocks at bilateral L4, L5, and S1 have been ordered. Anticoagulation/NPO Recommendations:   Please hold Naproxen (Aleve) x 7 days prior to day of procedure. Patient does NOT NEED to be NPO for procedure. Multidisciplinary Pain Management:   In the presence of complex, chronic, and multi-factorial pain, the importance of a multidisciplinary approach to pain management in the patients management regimen was emphasized and discussed in great detail. PHYSICAL THERAPY: Patient is advised to see a physical therapist for gentle stretching exercises and conditioning exercises for management of pain.      Referrals:  None    Prescriptions Written This Visit:   Cymbalta (Duloxetine)    Follow-up: For confirmatory bilateral medial branch blocks, then 1 week follow-up after procedure

## 2020-09-30 NOTE — PROGRESS NOTES
Chronic Pain Clinic Note     Encounter Date: 9/30/20    Subjective:   Chief Complaint:   Chief Complaint   Patient presents with    Follow-up     F/U procedure       History of Present Illness:   Dominique Ortiz is a 63 y.o. male seen in the Pain Clinic initially on 09/30/20 upon request from Gm Faria DO (Rheumatology) for his history of chronic low back pain. He has history of axial spondyloarthropathy. He has multiple pain complaints. He states that his feet are most bothersome to him and that this is been going on for greater than 15 years. In addition, he states that his low back and bilateral leg pain has progressively getting worse over 5 years. He does not have any recent trauma/fall/inciting event however he did sustain a fall from height greater than 15 feet over 20 years ago. He describes low back pain as midline with radiation towards both flanks. The pain does radiate down both legs on the posterior aspect of the thighs and into the posterior aspect of the calves and plantar aspects of the feet. He has associated numbness/tingling in both legs and feet diffusely throughout. He states that he feels like his legs have lost some strength and has difficulty with balance. In addition he states it is becoming increasingly difficult to walk further than a mile due to the pain in his feet and low back. His pain is exacerbated practically when he sits down to rest and at nighttime. He does have difficulty with sleep. He states that there is not much that does improve his pain, and he feels like the medications he is currently taking are not providing much relief. He denies any saddle anesthesia or bowel/bladder incontinence. Of note, he has an MRI L-spine performed in 2013 with multilevel degenerative changes with associated multilevel spinal stenosis, neuroforaminal stenosis and facet arthropathy.   He states that he had previous lumbar epidural injections well over 5 years ago with no response. He states that he has not been enrolled in physical therapy recently but he does endorse doing daily stretching prior to work. When discussing his leg numbness/tingling, he denies any history of diabetes, vitamin deficiencies particularly B12, excessive alcohol usage in his lifetime. He denies any family history that is significant for any neuropathic pain or neuropathy. Today, 9/30/2020, patient presents for plan follow-up after diagnostic lumbar medial branch blocks performed on 9/22/2020. He states he obtained 90% relief after the injections for the remainder the day. He noted improvement with standing upright and prolonged walking with his pain. He states he drastically improved to sleep at night. He states he continues to tolerate the Cymbalta dose and wants to increase the dose for potential benefit. He states he obtained his lumbar MRI in the interim. History of Intervention:   Surgery: No previous lumbar surgeries  Injections: Previous lumbar epidurals? - did not get much relief  Diagnostic lumbar MBBs (8/22/20) - 90% relief    Current Treatment Medications:   cosentyx inj - awaiting insurance approval  Sulfasalazine 1000 mg BID  Naproxen 500 mg PRN  Cymbalta 30 mg daily    Historical Treatment Medications:   Gabapentin 300 mg BID  Elavil - stopped due to interaction with Cymbalta    Imaging:  MRI L-spine (9/22/20)    FINDINGS:    There is minimal, grade 1, retrolisthesis of L2 relative to L3 and L3 relative to L4 on the basis of degenerative change. This is stable compared to prior exams. There is otherwise anatomic vertebral body height and alignment. There are small Schmorl's    nodes at the superior and inferior endplates of L1, stable compared to prior MRI. There are few scattered areas of hyperintense T1 and T2 signal in the lumbar spinal column as evidence for hemangiomas, similar to prior MRI. Marrow signal is otherwise    within normal limits.  The conus terminates in a normal fashion at the L1-2 level. There are parapelvic cysts in the bilateral kidneys. Paraspinal soft tissues are otherwise unremarkable. At T12-L1 there is a central protrusion without significant spinal canal or neuroforaminal stenosis. At L1-2 there is no significant spinal canal or neuroforaminal stenosis. At L2-3 there is minimal partial uncovering the disc with superimposed shallow disc bulge which mildly indents thecal sac but does not contact traversing nerve roots. There is moderate right and mild left neural foraminal stenosis in association with    facet hypertrophy and ligamentum flavum thickening, similar to prior exam.    At L3-4 there is minimal partial uncovering the disc with superimposed shallow disc bulge which mildly indents thecal sac and may contact traversing L4 nerve roots bilaterally and contributes to mild spinal canal stenosis in association with facet    hypertrophy and ligamentum flavum thickening, similar to prior exam. There is mild to moderate left and moderate right neuroforaminal stenosis. At L4-5 there is a shallow disc bulge which mildly indents thecal sac but does not contact traversing nerve roots and contributes to mild spinal canal stenosis in association with facet hypertrophy and ligament flavum thickening which has progressed in    the interval. There is mild to moderate bilateral neural foraminal stenosis. At L5-S1 there is a shallow disc bulge which mildly indents thecal sac but does not contact traversing nerve roots. There is moderate bilateral neural foraminal stenosis in association with facet hypertrophy and ligament flavum thickening, similar to    prior exam.              Impression     Multilevel degenerative changes largely stable compared to prior MRI in 2013 with areas of up to mild spinal canal stenosis and up to moderate neural foraminal stenosis.  Please refer to the body of the report for segmental analysis.             XR L-spine  Sexual activity: Not on file   Lifestyle    Physical activity     Days per week: Not on file     Minutes per session: Not on file    Stress: Not on file   Relationships    Social connections     Talks on phone: Not on file     Gets together: Not on file     Attends Jewish service: Not on file     Active member of club or organization: Not on file     Attends meetings of clubs or organizations: Not on file     Relationship status: Not on file    Intimate partner violence     Fear of current or ex partner: Not on file     Emotionally abused: Not on file     Physically abused: Not on file     Forced sexual activity: Not on file   Other Topics Concern    Not on file   Social History Narrative    Not on file       Medications & Allergies:   Current Outpatient Medications   Medication Instructions    aspirin 81 mg, DAILY    cetirizine (ZYRTEC ALLERGY) 10 mg, Oral, DAILY    DULoxetine (CYMBALTA) 60 mg, Oral, DAILY    lisinopril-hydrochlorothiazide (PRINZIDE;ZESTORETIC) 20-12.5 MG per tablet 1 tablet, 2 TIMES DAILY    metoprolol succinate (TOPROL XL) 50 mg, DAILY    naproxen (NAPROSYN) 500 mg, 2 TIMES DAILY WITH MEALS    pantoprazole (PROTONIX) 40 mg, DAILY    secukinumab, 300 MG Dose, (COSENTYX SENSOREADY, 300 MG,) 150 MG/ML SOAJ Inject 150mg subcu weekly for 5 weeks then 4 weeks start start injecting 150mg subcu every 4 weeks.     spironolactone (ALDACTONE) 25 mg, Oral, DAILY    sulfaSALAzine (AZULFIDINE) 1,000 mg, Oral, 2 TIMES DAILY    tamsulosin (FLOMAX) 0.4 mg, Oral, DAILY       No Known Allergies    Review of Systems:   Constitutional: negative   Eyes: negative   Ears, nose, mouth, throat, and face: negative   Respiratory: negative   Cardiovascular: negative   Gastrointestinal: negative   Genitourinary: negative for bowel/bladder incontinence   Hematologic/lymphatic: negative   Musculoskeletal: back pain, multiple joint pain  Neurological: numbness/tingling in legs/feet, leg weakness and cramping  Behavioral/Psych: negative for anxiety/depression   Allergic/Immunologic: negative     Objective:   /68 (Site: Left Upper Arm, Position: Sitting, Cuff Size: Large Adult)   Pulse 70   Temp 97.1 °F (36.2 °C) (Temporal)   Ht 6' 1\" (1.854 m)   Wt 255 lb (115.7 kg)   BMI 33.64 kg/m²     Constitutional: Pleasant, no acute distress   Head: Normocephalic, atraumatic   Eyes: Conjunctivae normal   Neck: Supple, symmetrical   Lungs: Normal respiratory effort, non-labored breathing   Cardiovascular: Limbs warm and well perfused   Abdomen: Non-protruded   Musculoskeletal: Muscle bulk symmetric, no atrophy, no gross deformities   · Lumbar Spine: ROM restricted with extension. Lumbar paraspinals tender bilaterally. SLR neg bilaterally. MIKAL neg bilaterally. GAENSLEN neg bilaterally. Positive facet loading bilaterally. Bilateral SI joints non-tender to palpation. Neurological: Cranial nerves II-XII grossly intact. · Gait - Slightly antalgic gait. Ambulates without assist device. · Motor: No focal motor deficit  Skin: No rashes or lesions present   Psychological: Cooperative, no exaggerated pain behaviors       Assessment:    Diagnosis Orders   1. Lumbar facet joint pain  CHG FLUOR NEEDLE/CATH SPINE/PARASPINAL DX/THER ADDON    VT INJ DX/THER AGNT PARAVERT FACET JOINT, LUMBAR/SAC, 1ST LEVEL    VT INJ DX/THER AGNT PARAVERT FACET JOINT, LUMBAR/SAC, 2ND LEVEL   2. Other chronic pain     3. Chronic bilateral low back pain with bilateral sciatica     4. Neuropathic pain     5. Polyarthralgia         Delano Anaya is a 63 y.o.male presenting to the pain clinic for evaluation of chronic low back, bilateral leg, and bilateral feet pain. Due to his axial spondyloarthropathy he does have advanced arthritis in his lumbar spine as evident on x-ray. He does have a large skill skeletal and neuropathic component to his pain.   He does have a clinical history and physical examination consistent with lumbar facet mediated pain and significant response to diagnostic bilateral lumbar medial branch blocks, we will proceed with confirmatory bilateral lumbar medial branch blocks. In addition we will increase his Cymbalta from 30 mg to 60 mg.  I have reviewed his new MRI of the lumbar spine and discussed these results with him today. Plan: The following treatment recommendations and plan were discussed in detail with Edi Begum. Imaging:   I have reviewed patients imaging of XR L-spine and new MRI L-spine and results were discussed with patient today. Analgesics:   Patient is taking Naproxen. Patient is advised to take as prescribed and not take on an empty stomach. Adjuvants: With associated musculoskeletal neuropathic pain, we will begin a trial of low-dose Cymbalta at 30 mg a day. I have discontinued his Elavil due to risk for serotonin syndrome. We will increase Cymbalta to the 60 mg a day. Interventions: In presence of axial low back and with physical exam consistent for lumbar  facetal pain and significant response to diagnostic lumbar medial branch blocks, confirmatory medial branch blocks at bilateral L4, L5, and S1 have been ordered. Anticoagulation/NPO Recommendations:   Please hold Naproxen (Aleve) x 7 days prior to day of procedure. Patient does NOT NEED to be NPO for procedure. Multidisciplinary Pain Management:   In the presence of complex, chronic, and multi-factorial pain, the importance of a multidisciplinary approach to pain management in the patients management regimen was emphasized and discussed in great detail. PHYSICAL THERAPY: Patient is advised to see a physical therapist for gentle stretching exercises and conditioning exercises for management of pain.      Referrals:  None    Prescriptions Written This Visit:   Cymbalta (Duloxetine)    Follow-up: For confirmatory bilateral medial branch blocks, then 1 week follow-up after procedure      Lilian Chadwick

## 2020-10-12 ENCOUNTER — TELEPHONE (OUTPATIENT)
Dept: PHYSICAL MEDICINE AND REHAB | Age: 60
End: 2020-10-12

## 2020-10-12 NOTE — H&P
Today, patient presents for planned confirmatory lumbar medial branch blocks at bilateral L4/L5 and L5/S1. This note is reflective of the patient's previous visit for evaluation. We will proceed with today's planned procedure. Since patient's last visit for evaluation, there have been no interval changes in medical history. Patient has no new numbness, weakness, or focal neurological deficit since evaluation. Patient has no contraindications to injection (no anticoagulation, recent antibiotic intake for active infections, allergies to latex / contrast dye / steroid medications), and has a  present. NPO necessity has been assessed and accepted based on procedure complexity. The risks and benefits of the procedure have been explained including but are not limited to infection, bleeding, paralysis, immediate post procedure weakness, and dizziness; the patient acknowledges understanding and desires to proceed with the procedure. Consented for same procedure. All other questions and concerns were addressed at bedside. See procedure note for full details. Vitals:    10/13/20 0948   BP: (!) 142/89   Pulse: 63   Resp: 18   Temp: 97.7 °F (36.5 °C)   SpO2: 97%        Diagnosis Orders   1. Lumbar facet joint pain  CHG FLUOR NEEDLE/CATH SPINE/PARASPINAL DX/THER ADDON    PA INJ DX/THER AGNT PARAVERT FACET JOINT, LUMBAR/SAC, 1ST LEVEL    PA INJ DX/THER AGNT PARAVERT FACET JOINT, LUMBAR/SAC, 2ND LEVEL   2. Other chronic pain     3. Chronic bilateral low back pain with bilateral sciatica     4. Neuropathic pain     5. Polyarthralgia       Follow up: 10/21/20     Post procedure Instructions: The patient was advised not to drive during the day of the procedure and not to engage in any significant decision making. The patient was also advised to be cautious with walking/activity for 24 hours following today's visit and asked not to engage in over-exertion.  After this time, it is ok to resume pre-procedure level of activity. Patient advised to apply ice to site of injection in situations of pain and discomfort. Patient advised to not submerge site of injection during bath or pool activities for approximately 48 hours post-procedure. Patient attested to understanding post procedure directions / restrictions. All other questions and concerns addressed before patient discharge in ambulatory fashion. Chronic Pain Clinic Note     Encounter Date: 9/30/20    Subjective:   Chief Complaint:   No chief complaint on file. History of Present Illness:   Charlanne Sicard is a 63 y.o. male seen in the Pain Clinic initially on 10/12/20 upon request from Sarahi Miller DO (Rheumatology) for his history of chronic low back pain. He has history of axial spondyloarthropathy. He has multiple pain complaints. He states that his feet are most bothersome to him and that this is been going on for greater than 15 years. In addition, he states that his low back and bilateral leg pain has progressively getting worse over 5 years. He does not have any recent trauma/fall/inciting event however he did sustain a fall from height greater than 15 feet over 20 years ago. He describes low back pain as midline with radiation towards both flanks. The pain does radiate down both legs on the posterior aspect of the thighs and into the posterior aspect of the calves and plantar aspects of the feet. He has associated numbness/tingling in both legs and feet diffusely throughout. He states that he feels like his legs have lost some strength and has difficulty with balance. In addition he states it is becoming increasingly difficult to walk further than a mile due to the pain in his feet and low back. His pain is exacerbated practically when he sits down to rest and at nighttime. He does have difficulty with sleep.   He states that there is not much that does improve his pain, and he feels like the medications he is currently taking are not height and alignment. There are small Schmorl's    nodes at the superior and inferior endplates of L1, stable compared to prior MRI. There are few scattered areas of hyperintense T1 and T2 signal in the lumbar spinal column as evidence for hemangiomas, similar to prior MRI. Marrow signal is otherwise    within normal limits. The conus terminates in a normal fashion at the L1-2 level. There are parapelvic cysts in the bilateral kidneys. Paraspinal soft tissues are otherwise unremarkable. At T12-L1 there is a central protrusion without significant spinal canal or neuroforaminal stenosis. At L1-2 there is no significant spinal canal or neuroforaminal stenosis. At L2-3 there is minimal partial uncovering the disc with superimposed shallow disc bulge which mildly indents thecal sac but does not contact traversing nerve roots. There is moderate right and mild left neural foraminal stenosis in association with    facet hypertrophy and ligamentum flavum thickening, similar to prior exam.    At L3-4 there is minimal partial uncovering the disc with superimposed shallow disc bulge which mildly indents thecal sac and may contact traversing L4 nerve roots bilaterally and contributes to mild spinal canal stenosis in association with facet    hypertrophy and ligamentum flavum thickening, similar to prior exam. There is mild to moderate left and moderate right neuroforaminal stenosis. At L4-5 there is a shallow disc bulge which mildly indents thecal sac but does not contact traversing nerve roots and contributes to mild spinal canal stenosis in association with facet hypertrophy and ligament flavum thickening which has progressed in    the interval. There is mild to moderate bilateral neural foraminal stenosis. At L5-S1 there is a shallow disc bulge which mildly indents thecal sac but does not contact traversing nerve roots.  There is moderate bilateral neural foraminal stenosis in association with facet hypertrophy and ligament flavum thickening, similar to    prior exam.              Impression     Multilevel degenerative changes largely stable compared to prior MRI in 2013 with areas of up to mild spinal canal stenosis and up to moderate neural foraminal stenosis. Please refer to the body of the report for segmental analysis.             XR L-spine (8/27/20)  FINDINGS: No acute fracture or subluxation. Degenerative disc disease with disc space narrowing at virtually all levels. The disc space narrowing has progressed at L4-5 and L5 1 since the prior exam. There are bridging osteophytes at L3-4, L4-5, and to a     lesser degree at L2-3. Pedicles are intact. Probable discal calcification at T10-11. Bridging osteophyte at T12-L1              Impression    Diffuse degenerative changes throughout the lumbar spine.  Disc space narrowing has progressed at L4-5 and L5-S1 since the prior exam        Past Medical History:   Diagnosis Date    GERD (gastroesophageal reflux disease)     Hypertension     Neuropathy     Osteoarthritis        Past Surgical History:   Procedure Laterality Date    COLONOSCOPY  12/2019    FOOT SURGERY  2009    KNEE SURGERY      1997, 2008 (right) 2007 (Left)   [address] RESECTION  2010    Chest    PAIN MANAGEMENT PROCEDURE Bilateral 9/22/2020    medial branch blocks at bilateral L4, L5, and S1 performed by Trent Newton DO at [facility] 67 Left 08/2019       Family History   Problem Relation Age of Onset    Cancer Mother         brain    High Blood Pressure Father     High Blood Pressure Brother     Diabetes Brother        Social History     Socioeconomic History    Marital status:      Spouse name: Not on file    Number of children: Not on file    Years of education: Not on file    Highest education level: Not on file   Occupational History    Not on file   Social Needs    Financial resource strain: Not on file    Food insecurity     Worry: Not on file     Inability: Not on file    Transportation needs     Medical: Not on file     Non-medical: Not on file   Tobacco Use    Smoking status: Never Smoker    Smokeless tobacco: Never Used   Substance and Sexual Activity    Alcohol use: Yes     Alcohol/week: 12.0 standard drinks     Types: 12 Cans of beer per week    Drug use: Never    Sexual activity: Not on file   Lifestyle    Physical activity     Days per week: Not on file     Minutes per session: Not on file    Stress: Not on file   Relationships    Social connections     Talks on phone: Not on file     Gets together: Not on file     Attends Mor[employer] service: Not on file     Active member of club or organization: Not on file     Attends meetings of clubs or organizations: Not on file     Relationship status: Not on file    Intimate partner violence     Fear of current or ex partner: Not on file     Emotionally abused: Not on file     Physically abused: Not on file     Forced sexual activity: Not on file   Other Topics Concern    Not on file   Social History Narrative    Not on file       Medications & Allergies:   Current Outpatient Medications   Medication Instructions    aspirin 81 mg, DAILY    cetirizine (ZYRTEC ALLERGY) 10 mg, Oral, DAILY    DULoxetine (CYMBALTA) 60 mg, Oral, DAILY    lisinopril-hydrochlorothiazide (PRINZIDE;ZESTORETIC) 20-12.5 MG per tablet 1 tablet, 2 TIMES DAILY    metoprolol succinate (TOPROL XL) 50 mg, DAILY    naproxen (NAPROSYN) 500 mg, 2 TIMES DAILY WITH MEALS    pantoprazole (PROTONIX) 40 mg, DAILY    secukinumab, 300 MG Dose, (COSENTYX SENSOREADY, 300 MG,) 150 MG/ML SOAJ Inject 150mg subcu weekly for 5 weeks then 4 weeks start start injecting 150mg subcu every 4 weeks.     spironolactone (ALDACTONE) 25 mg, Oral, DAILY    sulfaSALAzine (AZULFIDINE) 1,000 mg, Oral, 2 TIMES DAILY    tamsulosin (FLOMAX) 0.4 mg, Oral, DAILY       No Known Allergies    Review of Systems:   Constitutional: negative   Eyes: spine as evident on x-ray. He does have a large skill skeletal and neuropathic component to his pain. He does have a clinical history and physical examination consistent with lumbar facet mediated pain and significant response to diagnostic bilateral lumbar medial branch blocks, we will proceed with confirmatory bilateral lumbar medial branch blocks. In addition we will increase his Cymbalta from 30 mg to 60 mg.  I have reviewed his new MRI of the lumbar spine and discussed these results with him today. Plan: The following treatment recommendations and plan were discussed in detail with Alfred Betancourt. Imaging:   I have reviewed patients imaging of XR L-spine and new MRI L-spine and results were discussed with patient today. Analgesics:   Patient is taking Naproxen. Patient is advised to take as prescribed and not take on an empty stomach. Adjuvants: With associated musculoskeletal neuropathic pain, we will begin a trial of low-dose Cymbalta at 30 mg a day. I have discontinued his Elavil due to risk for serotonin syndrome. We will increase Cymbalta to the 60 mg a day. Interventions: In presence of axial low back and with physical exam consistent for lumbar  facetal pain and significant response to diagnostic lumbar medial branch blocks, confirmatory medial branch blocks at bilateral L4, L5, and S1 have been ordered. Anticoagulation/NPO Recommendations:   Please hold Naproxen (Aleve) x 7 days prior to day of procedure. Patient does NOT NEED to be NPO for procedure. Multidisciplinary Pain Management:   In the presence of complex, chronic, and multi-factorial pain, the importance of a multidisciplinary approach to pain management in the patients management regimen was emphasized and discussed in great detail. PHYSICAL THERAPY: Patient is advised to see a physical therapist for gentle stretching exercises and conditioning exercises for management of pain. Referrals:  None    Prescriptions Written This Visit:   Cymbalta (Duloxetine)    Follow-up: For confirmatory bilateral medial branch blocks, then 1 week follow-up after procedure      Marco Antonio Kwong,   Interventional Pain Management/PM&R   [place]

## 2020-10-13 ENCOUNTER — HOSPITAL ENCOUNTER (OUTPATIENT)
Age: 60
Setting detail: OUTPATIENT SURGERY
Discharge: HOME | End: 2020-10-13
Attending: ANESTHESIOLOGY | Admitting: ANESTHESIOLOGY
Payer: COMMERCIAL

## 2020-10-13 ENCOUNTER — APPOINTMENT (OUTPATIENT)
Dept: GENERAL RADIOLOGY | Age: 60
End: 2020-10-13
Attending: ANESTHESIOLOGY
Payer: COMMERCIAL

## 2020-10-13 VITALS
HEIGHT: 73 IN | BODY MASS INDEX: 33.5 KG/M2 | TEMPERATURE: 97.7 F | WEIGHT: 252.8 LBS | SYSTOLIC BLOOD PRESSURE: 142 MMHG | RESPIRATION RATE: 18 BRPM | HEART RATE: 63 BPM | DIASTOLIC BLOOD PRESSURE: 89 MMHG | OXYGEN SATURATION: 97 %

## 2020-10-13 PROCEDURE — 64493 INJ PARAVERT F JNT L/S 1 LEV: CPT | Performed by: ANESTHESIOLOGY

## 2020-10-13 PROCEDURE — 3600000053 HC PAIN LEVEL 2 ADDL 15 MIN: Performed by: ANESTHESIOLOGY

## 2020-10-13 PROCEDURE — 2500000003 HC RX 250 WO HCPCS: Performed by: ANESTHESIOLOGY

## 2020-10-13 PROCEDURE — 64494 INJ PARAVERT F JNT L/S 2 LEV: CPT | Performed by: ANESTHESIOLOGY

## 2020-10-13 PROCEDURE — 3209999900 FLUORO FOR SURGICAL PROCEDURES

## 2020-10-13 PROCEDURE — 7100000010 HC PHASE II RECOVERY - FIRST 15 MIN: Performed by: ANESTHESIOLOGY

## 2020-10-13 PROCEDURE — 3600000052 HC PAIN LEVEL 2 BASE: Performed by: ANESTHESIOLOGY

## 2020-10-13 PROCEDURE — 2709999900 HC NON-CHARGEABLE SUPPLY: Performed by: ANESTHESIOLOGY

## 2020-10-13 RX ORDER — BUPIVACAINE HYDROCHLORIDE 5 MG/ML
INJECTION, SOLUTION PERINEURAL PRN
Status: DISCONTINUED | OUTPATIENT
Start: 2020-10-13 | End: 2020-10-13 | Stop reason: ALTCHOICE

## 2020-10-13 RX ORDER — LIDOCAINE HYDROCHLORIDE 10 MG/ML
INJECTION, SOLUTION EPIDURAL; INFILTRATION; INTRACAUDAL; PERINEURAL PRN
Status: DISCONTINUED | OUTPATIENT
Start: 2020-10-13 | End: 2020-10-13 | Stop reason: ALTCHOICE

## 2020-10-13 ASSESSMENT — PAIN SCALES - GENERAL
PAINLEVEL_OUTOF10: 4
PAINLEVEL_OUTOF10: 0

## 2020-10-13 ASSESSMENT — PAIN DESCRIPTION - ORIENTATION: ORIENTATION: LOWER

## 2020-10-13 ASSESSMENT — PAIN DESCRIPTION - DIRECTION: RADIATING_TOWARDS: BIL LEGS

## 2020-10-13 ASSESSMENT — PAIN DESCRIPTION - LOCATION: LOCATION: BACK

## 2020-10-13 ASSESSMENT — PAIN DESCRIPTION - PAIN TYPE: TYPE: CHRONIC PAIN

## 2020-10-13 NOTE — PROCEDURES
Pre-operative Diagnosis: Bilateral lumbar facet pain     Post-operative Diagnosis: Bilateral lumbar facet pain     Procedure: Bilateral lumbar medial branch blocks targeting facet joints of L4/L5 and L5/S1     Procedure Description:  After consent was obtained, the patient was placed in the prone position having pressure points appropriately padded. The lower back was prepped with chloraprep and draped in a sterile fashion. 0.5 cc of 1 % lidocaine was used for local anesthesia of the skin and superficial subcutaneous tissues. Three 22-gauge 3.5-inch needles were advanced under fluoroscopy in an AP view with caudad angulation: one at the sacral ala and two at the junction of the right superior articular process and the transverse process of the L4 and L5 vertebrae. There was no paresthesias, heme, or CSF obtained. Needle placement was confirmed using AP and oblique views. Then 0.5 cc of 0.5% bupivacaine was injected at the site without complications. The procedure was repeated at L4, L5, and sacral ala on the left side. The patient tolerated the procedure well, transported to the recovery room, and observed for 15 minutes prior to being discharged in ambulatory fashion.      Procedural Complications: None       Marco Antonio Armando DO  Interventional Pain Management/PM&R   [facility]

## 2020-10-13 NOTE — PROGRESS NOTES
8339:  Dr. Mario Babin here to speak with Afsaneh Tilley.   Pt walked to car for wife to drive him home

## 2020-10-13 NOTE — PROGRESS NOTES
1011:  Pt arrived to Phase II PACU, pt denies pain at this time. Obdulia Crockett declines anything to eat or drink.

## 2020-10-21 ENCOUNTER — OFFICE VISIT (OUTPATIENT)
Dept: PHYSICAL MEDICINE AND REHAB | Age: 60
End: 2020-10-21
Payer: COMMERCIAL

## 2020-10-21 VITALS
DIASTOLIC BLOOD PRESSURE: 82 MMHG | TEMPERATURE: 96.8 F | SYSTOLIC BLOOD PRESSURE: 136 MMHG | HEIGHT: 73 IN | BODY MASS INDEX: 33.4 KG/M2 | WEIGHT: 252 LBS

## 2020-10-21 PROCEDURE — 99213 OFFICE O/P EST LOW 20 MIN: CPT | Performed by: ANESTHESIOLOGY

## 2020-10-21 NOTE — PATIENT INSTRUCTIONS
The following treatment recommendations and plan were discussed in detail with Darlin Hashimoto. Imaging:   I have reviewed patients imaging of XR L-spine and new MRI L-spine and results were discussed with patient today. Analgesics:   Patient is taking Naproxen. Patient is advised to take as prescribed and not take on an empty stomach. Adjuvants: With associated musculoskeletal neuropathic pain, we will begin a trial of low-dose Cymbalta at 30 mg a day. I have discontinued his Elavil due to risk for serotonin syndrome. We will continue his Cymbalta at 60 mg a day. Interventions: In presence of axial low back and with physical exam consistent for lumbar  facetal pain and significant response to diagnostic and confirmatory lumbar medial branch blocks, we will proceed with radiofrequency ablation at bilateral L4, L5, and S1 have been ordered. Anticoagulation/NPO Recommendations:   Please hold Naproxen (Aleve) x 7 days prior to day of procedure. Patient does NOT NEED to be NPO for procedure. Multidisciplinary Pain Management:   In the presence of complex, chronic, and multi-factorial pain, the importance of a multidisciplinary approach to pain management in the patients management regimen was emphasized and discussed in great detail. PHYSICAL THERAPY: Patient is advised to see a physical therapist for gentle stretching exercises and conditioning exercises for management of pain.      Referrals:  None    Prescriptions Written This Visit:   None    Follow-up: For lumbar RFA

## 2020-10-21 NOTE — PROGRESS NOTES
Chronic Pain Clinic Note     Encounter Date: 10/21/20    Subjective:   Chief Complaint:   Chief Complaint   Patient presents with    Follow-up       History of Present Illness:   Juarez Urrutia is a 63 y.o. male seen in the Pain Clinic initially on 09/04/20 upon request from Carlos Begum DO (Rheumatology) for his history of chronic low back pain. He has history of axial spondyloarthropathy. He has multiple pain complaints. He states that his feet are most bothersome to him and that this is been going on for greater than 15 years. In addition, he states that his low back and bilateral leg pain has progressively getting worse over 5 years. He does not have any recent trauma/fall/inciting event however he did sustain a fall from height greater than 15 feet over 20 years ago. He describes low back pain as midline with radiation towards both flanks. The pain does radiate down both legs on the posterior aspect of the thighs and into the posterior aspect of the calves and plantar aspects of the feet. He has associated numbness/tingling in both legs and feet diffusely throughout. He states that he feels like his legs have lost some strength and has difficulty with balance. In addition he states it is becoming increasingly difficult to walk further than a mile due to the pain in his feet and low back. His pain is exacerbated practically when he sits down to rest and at nighttime. He does have difficulty with sleep. He states that there is not much that does improve his pain, and he feels like the medications he is currently taking are not providing much relief. He denies any saddle anesthesia or bowel/bladder incontinence. Of note, he has an MRI L-spine performed in 2013 with multilevel degenerative changes with associated multilevel spinal stenosis, neuroforaminal stenosis and facet arthropathy. He states that he had previous lumbar epidural injections well over 5 years ago with no response.   He states that he has not been enrolled in physical therapy recently but he does endorse doing daily stretching prior to work. When discussing his leg numbness/tingling, he denies any history of diabetes, vitamin deficiencies particularly B12, excessive alcohol usage in his lifetime. He denies any family history that is significant for any neuropathic pain or neuropathy. Today, 10/21/20, patient presents for plan follow-up after confirmatory lumbar medial branch blocks performed on 10/13/2020. He reports even better improvement than his diagnostic test obtaining 90-95% relief after the injection for the remainder of the day. He notes improvements and standing upright for prolonged period and walking a further distance with substantial relief in his pain. He states that he had drastically improved his sleep at night as well. He continues to take his Cymbalta 60 mg a day and has noticed this is helped with his low back pain and the numbness/tingling in his thighs. History of Intervention:   Surgery: No previous lumbar surgeries  Injections: Previous lumbar epidurals? - did not get much relief  Diagnostic lumbar MBBs (9/22/20)-90% relief  Confirmatory lumbar MBBs (10/13/2020)-95% relief    Current Treatment Medications:   cosentyx inj - awaiting insurance approval  Sulfasalazine 1000 mg BID  Naproxen 500 mg PRN  Cymbalta 60 mg daily    Historical Treatment Medications:   Gabapentin 300 mg BID  Elavil - stopped due to interaction with Cymbalta    Imaging:  MRI L-spine (9/22/20)    FINDINGS:    There is minimal, grade 1, retrolisthesis of L2 relative to L3 and L3 relative to L4 on the basis of degenerative change. This is stable compared to prior exams. There is otherwise anatomic vertebral body height and alignment. There are small Schmorl's    nodes at the superior and inferior endplates of L1, stable compared to prior MRI.  There are few scattered areas of hyperintense T1 and T2 signal in the lumbar spinal to moderate neural foraminal stenosis. Please refer to the body of the report for segmental analysis.             XR L-spine (8/27/20)  FINDINGS: No acute fracture or subluxation. Degenerative disc disease with disc space narrowing at virtually all levels. The disc space narrowing has progressed at L4-5 and L5 1 since the prior exam. There are bridging osteophytes at L3-4, L4-5, and to a     lesser degree at L2-3. Pedicles are intact. Probable discal calcification at T10-11. Bridging osteophyte at T12-L1              Impression    Diffuse degenerative changes throughout the lumbar spine.  Disc space narrowing has progressed at L4-5 and L5-S1 since the prior exam        Past Medical History:   Diagnosis Date    GERD (gastroesophageal reflux disease)     Hypertension     Neuropathy     Osteoarthritis        Past Surgical History:   Procedure Laterality Date    COLONOSCOPY  12/2019    FOOT SURGERY  2009    KNEE SURGERY      1997, 2008 (right) 2007 (Left)   [address] RESECTION  2010    Chest    PAIN MANAGEMENT PROCEDURE Bilateral 9/22/2020    medial branch blocks at bilateral L4, L5, and S1 performed by Mario Null DO at [facility] 113 Bilateral 10/13/2020    medial branch blocks at bilateral L4, L5, and S1 performed by Mario Null DO at [facility] 68 Left 08/2019       Family History   Problem Relation Age of Onset    Cancer Mother         brain    High Blood Pressure Father     High Blood Pressure Brother     Diabetes Brother        Social History     Socioeconomic History    Marital status:      Spouse name: Not on file    Number of children: Not on file    Years of education: Not on file    Highest education level: Not on file   Occupational History    Not on file   Social Needs    Financial resource strain: Not on file    Food insecurity     Worry: Not on file     Inability: Not on file   Utility Scale Solar needs     Medical: Not on file     Non-medical: Not on file   Tobacco Use    Smoking status: Never Smoker    Smokeless tobacco: Never Used   Substance and Sexual Activity    Alcohol use: Yes     Alcohol/week: 12.0 standard drinks     Types: 12 Cans of beer per week    Drug use: Never    Sexual activity: Not on file   Lifestyle    Physical activity     Days per week: Not on file     Minutes per session: Not on file    Stress: Not on file   Relationships    Social connections     Talks on phone: Not on file     Gets together: Not on file     Attends [employer] service: Not on file     Active member of club or organization: Not on file     Attends meetings of clubs or organizations: Not on file     Relationship status: Not on file    Intimate partner violence     Fear of current or ex partner: Not on file     Emotionally abused: Not on file     Physically abused: Not on file     Forced sexual activity: Not on file   Other Topics Concern    Not on file   Social History Narrative    Not on file       Medications & Allergies:   Current Outpatient Medications   Medication Instructions    aspirin 81 mg, DAILY    cetirizine (ZYRTEC ALLERGY) 10 mg, Oral, DAILY    DULoxetine (CYMBALTA) 60 mg, Oral, DAILY    lisinopril-hydrochlorothiazide (PRINZIDE;ZESTORETIC) 20-12.5 MG per tablet 1 tablet, 2 TIMES DAILY    metoprolol succinate (TOPROL XL) 50 mg, DAILY    naproxen (NAPROSYN) 500 mg, 2 TIMES DAILY WITH MEALS    pantoprazole (PROTONIX) 40 mg, DAILY    secukinumab, 300 MG Dose, (COSENTYX SENSOREADY, 300 MG,) 150 MG/ML SOAJ Inject 150mg subcu weekly for 5 weeks then 4 weeks start start injecting 150mg subcu every 4 weeks.     spironolactone (ALDACTONE) 25 mg, Oral, DAILY    sulfaSALAzine (AZULFIDINE) 1,000 mg, Oral, 2 TIMES DAILY    tamsulosin (FLOMAX) 0.4 mg, Oral, DAILY       No Known Allergies    Review of Systems:   Constitutional: negative   Eyes: negative   Ears, nose, mouth, throat, and face: negative Respiratory: negative   Cardiovascular: negative   Gastrointestinal: negative   Genitourinary: negative for bowel/bladder incontinence   Hematologic/lymphatic: negative   Musculoskeletal: back pain, multiple joint pain  Neurological: numbness/tingling in legs/feet, leg weakness and cramping  Behavioral/Psych: negative for anxiety/depression   Allergic/Immunologic: negative     Objective:   /82   Temp 96.8 °F (36 °C)   Ht 6' 1\" (1.854 m)   Wt 252 lb (114.3 kg)   BMI 33.25 kg/m²     Constitutional: Pleasant, no acute distress   Head: Normocephalic, atraumatic   Eyes: Conjunctivae normal   Neck: Supple, symmetrical   Lungs: Normal respiratory effort, non-labored breathing   Cardiovascular: Limbs warm and well perfused   Abdomen: Non-protruded   Musculoskeletal: Muscle bulk symmetric, no atrophy, no gross deformities   · Lumbar Spine: ROM restricted with extension. Lumbar paraspinals tender bilaterally. SLR neg bilaterally. MIKAL neg bilaterally. GAENSLEN neg bilaterally. Positive facet loading bilaterally. Bilateral SI joints non-tender to palpation. Neurological: Cranial nerves II-XII grossly intact. · Gait - Slightly antalgic gait. Ambulates without assist device. · Motor: No focal motor deficit  Skin: No rashes or lesions present   Psychological: Cooperative, no exaggerated pain behaviors       Assessment:    Diagnosis Orders   1. Lumbar facet joint pain  DE RADIOFREQUENCY NEUROTOMY LUMBAR OR SACRAL, W IMAGE GUIDANCE, SINGLE    DE RADIOFREQ NEUROTOMY LUMBAR OR SACRAL, W IMAGE GUIDE,EA ADDL LEVEL    CHG FLUOR NEEDLE/CATH SPINE/PARASPINAL DX/THER ADDON    FLUORO FOR SURGICAL PROCEDURES   2. Other chronic pain     3. Chronic bilateral low back pain with bilateral sciatica     4. Neuropathic pain     5. Polyarthralgia         Desire Radford is a 63 y.o.male presenting to the pain clinic for evaluation of chronic low back, bilateral leg, and bilateral feet pain.   Due to his axial spondyloarthropathy he does have advanced arthritis in his lumbar spine as evident on x-ray. He does have a large skill skeletal and neuropathic component to his pain. He does have a clinical history and physical examination consistent with lumbar facet mediated pain and significant response to diagnostic and confirmatory bilateral lumbar medial branch blocks, we will proceed with radiofrequency ablation targeting the bilateral facet joints of L4/L5 and L5/S1. In addition, the patient is tolerating Cymbalta 60 mg and has noted benefit on this dose. Plan: The following treatment recommendations and plan were discussed in detail with Alfred Betancourt. Imaging:   I have reviewed patients imaging of XR L-spine and new MRI L-spine. Analgesics:   Patient is taking Naproxen. Patient is advised to take as prescribed and not take on an empty stomach. Adjuvants: With associated musculoskeletal neuropathic pain, we will continue Cymbalta 60 mg/day as he has noticed significant benefit on this medication. Interventions: In presence of axial low back and with physical exam consistent for lumbar  facetal pain and significant response to diagnostic and confirmatory lumbar medial branch blocks, we will proceed with radiofrequency ablation at bilateral L4, L5, and S1 have been ordered. Anticoagulation/NPO Recommendations:   Please hold Naproxen (Aleve) x 7 days prior to day of procedure. Patient does NOT NEED to be NPO for procedure. Multidisciplinary Pain Management:   In the presence of complex, chronic, and multi-factorial pain, the importance of a multidisciplinary approach to pain management in the patients management regimen was emphasized and discussed in great detail. PHYSICAL THERAPY: Patient is advised to see a physical therapist for gentle stretching exercises and conditioning exercises for management of pain.      Referrals:  None    Prescriptions Written This Visit:   None    Follow-up: For lumbar

## 2020-11-02 NOTE — H&P
pre-procedure level of activity. Patient advised to apply ice to site of injection in situations of pain and discomfort. Patient advised to not submerge site of injection during bath or pool activities for approximately 48 hours post-procedure. Patient attested to understanding post procedure directions / restrictions. All other questions and concerns addressed before patient discharge in ambulatory fashion. Chronic Pain Clinic Note     Encounter Date: 10/21/20    Subjective:   Chief Complaint:   No chief complaint on file. History of Present Illness:   Juarez Urrutia is a 63 y.o. male seen in the Pain Clinic initially on 09/04/20 upon request from Carlos Begum DO (Rheumatology) for his history of chronic low back pain. He has history of axial spondyloarthropathy. He has multiple pain complaints. He states that his feet are most bothersome to him and that this is been going on for greater than 15 years. In addition, he states that his low back and bilateral leg pain has progressively getting worse over 5 years. He does not have any recent trauma/fall/inciting event however he did sustain a fall from height greater than 15 feet over 20 years ago. He describes low back pain as midline with radiation towards both flanks. The pain does radiate down both legs on the posterior aspect of the thighs and into the posterior aspect of the calves and plantar aspects of the feet. He has associated numbness/tingling in both legs and feet diffusely throughout. He states that he feels like his legs have lost some strength and has difficulty with balance. In addition he states it is becoming increasingly difficult to walk further than a mile due to the pain in his feet and low back. His pain is exacerbated practically when he sits down to rest and at nighttime. He does have difficulty with sleep.   He states that there is not much that does improve his pain, and he feels like the medications he is currently taking are not providing much relief. He denies any saddle anesthesia or bowel/bladder incontinence. Of note, he has an MRI L-spine performed in 2013 with multilevel degenerative changes with associated multilevel spinal stenosis, neuroforaminal stenosis and facet arthropathy. He states that he had previous lumbar epidural injections well over 5 years ago with no response. He states that he has not been enrolled in physical therapy recently but he does endorse doing daily stretching prior to work. When discussing his leg numbness/tingling, he denies any history of diabetes, vitamin deficiencies particularly B12, excessive alcohol usage in his lifetime. He denies any family history that is significant for any neuropathic pain or neuropathy. Today, 10/21/20, patient presents for plan follow-up after confirmatory lumbar medial branch blocks performed on 10/13/2020. He reports even better improvement than his diagnostic test obtaining 90-95% relief after the injection for the remainder of the day. He notes improvements and standing upright for prolonged period and walking a further distance with substantial relief in his pain. He states that he had drastically improved his sleep at night as well. He continues to take his Cymbalta 60 mg a day and has noticed this is helped with his low back pain and the numbness/tingling in his thighs.     History of Intervention:   Surgery: No previous lumbar surgeries  Injections: Previous lumbar epidurals? - did not get much relief  Diagnostic lumbar MBBs (9/22/20)-90% relief  Confirmatory lumbar MBBs (10/13/2020)-95% relief    Current Treatment Medications:   cosentyx inj - awaiting insurance approval  Sulfasalazine 1000 mg BID  Naproxen 500 mg PRN  Cymbalta 60 mg daily    Historical Treatment Medications:   Gabapentin 300 mg BID  Elavil - stopped due to interaction with Cymbalta    Imaging:  MRI L-spine (9/22/20)    FINDINGS:    There is minimal, grade 1, retrolisthesis of L2 relative to L3 and L3 relative to L4 on the basis of degenerative change. This is stable compared to prior exams. There is otherwise anatomic vertebral body height and alignment. There are small Schmorl's    nodes at the superior and inferior endplates of L1, stable compared to prior MRI. There are few scattered areas of hyperintense T1 and T2 signal in the lumbar spinal column as evidence for hemangiomas, similar to prior MRI. Marrow signal is otherwise    within normal limits. The conus terminates in a normal fashion at the L1-2 level. There are parapelvic cysts in the bilateral kidneys. Paraspinal soft tissues are otherwise unremarkable. At T12-L1 there is a central protrusion without significant spinal canal or neuroforaminal stenosis. At L1-2 there is no significant spinal canal or neuroforaminal stenosis. At L2-3 there is minimal partial uncovering the disc with superimposed shallow disc bulge which mildly indents thecal sac but does not contact traversing nerve roots. There is moderate right and mild left neural foraminal stenosis in association with    facet hypertrophy and ligamentum flavum thickening, similar to prior exam.    At L3-4 there is minimal partial uncovering the disc with superimposed shallow disc bulge which mildly indents thecal sac and may contact traversing L4 nerve roots bilaterally and contributes to mild spinal canal stenosis in association with facet    hypertrophy and ligamentum flavum thickening, similar to prior exam. There is mild to moderate left and moderate right neuroforaminal stenosis. At L4-5 there is a shallow disc bulge which mildly indents thecal sac but does not contact traversing nerve roots and contributes to mild spinal canal stenosis in association with facet hypertrophy and ligament flavum thickening which has progressed in    the interval. There is mild to moderate bilateral neural foraminal stenosis.     At L5-S1 there is a shallow disc bulge which mildly indents thecal sac but does not contact traversing nerve roots. There is moderate bilateral neural foraminal stenosis in association with facet hypertrophy and ligament flavum thickening, similar to    prior exam.              Impression     Multilevel degenerative changes largely stable compared to prior MRI in 2013 with areas of up to mild spinal canal stenosis and up to moderate neural foraminal stenosis. Please refer to the body of the report for segmental analysis.             XR L-spine (8/27/20)  FINDINGS: No acute fracture or subluxation. Degenerative disc disease with disc space narrowing at virtually all levels. The disc space narrowing has progressed at L4-5 and L5 1 since the prior exam. There are bridging osteophytes at L3-4, L4-5, and to a     lesser degree at L2-3. Pedicles are intact. Probable discal calcification at T10-11. Bridging osteophyte at T12-L1              Impression    Diffuse degenerative changes throughout the lumbar spine.  Disc space narrowing has progressed at L4-5 and L5-S1 since the prior exam        Past Medical History:   Diagnosis Date    GERD (gastroesophageal reflux disease)     Hypertension     Neuropathy     Osteoarthritis        Past Surgical History:   Procedure Laterality Date    COLONOSCOPY  12/2019    FOOT SURGERY  2009    KNEE SURGERY      1997, 2008 (right) 2007 (Left)    LIPOMA RESECTION  2010    Chest    PAIN MANAGEMENT PROCEDURE Bilateral 9/22/2020    medial branch blocks at bilateral L4, L5, and S1 performed by Rocky Sanchez DO at [facility] 113 Bilateral 10/13/2020    medial branch blocks at bilateral L4, L5, and S1 performed by Rocky Sanchez DO at [facility] 68 Left 08/2019       Family History   Problem Relation Age of Onset    Cancer Mother         brain    High Blood Pressure Father     High Blood Pressure Brother     Diabetes Brother Social History     Socioeconomic History    Marital status:      Spouse name: Not on file    Number of children: Not on file    Years of education: Not on file    Highest education level: Not on file   Occupational History    Not on file   Social Needs    Financial resource strain: Not on file    Food insecurity     Worry: Not on file     Inability: Not on file    Transportation needs     Medical: Not on file     Non-medical: Not on file   Tobacco Use    Smoking status: Never Smoker    Smokeless tobacco: Never Used   Substance and Sexual Activity    Alcohol use:  Yes     Alcohol/week: 12.0 standard drinks     Types: 12 Cans of beer per week    Drug use: Never    Sexual activity: Not on file   Lifestyle    Physical activity     Days per week: Not on file     Minutes per session: Not on file    Stress: Not on file   Relationships    Social connections     Talks on phone: Not on file     Gets together: Not on file     Attends [employer] service: Not on file     Active member of club or organization: Not on file     Attends meetings of clubs or organizations: Not on file     Relationship status: Not on file    Intimate partner violence     Fear of current or ex partner: Not on file     Emotionally abused: Not on file     Physically abused: Not on file     Forced sexual activity: Not on file   Other Topics Concern    Not on file   Social History Narrative    Not on file       Medications & Allergies:   Current Outpatient Medications   Medication Instructions    aspirin 81 mg, DAILY    cetirizine (ZYRTEC ALLERGY) 10 mg, Oral, DAILY    DULoxetine (CYMBALTA) 60 mg, Oral, DAILY    lisinopril-hydrochlorothiazide (PRINZIDE;ZESTORETIC) 20-12.5 MG per tablet 1 tablet, 2 TIMES DAILY    metoprolol succinate (TOPROL XL) 50 mg, DAILY    naproxen (NAPROSYN) 500 mg, 2 TIMES DAILY WITH MEALS    pantoprazole (PROTONIX) 40 mg, DAILY    secukinumab, 300 MG Dose, (COSENTYX SENSOREADY, 300 MG,) 150 MG/ML SOAJ Inject 150mg subcu weekly for 5 weeks then 4 weeks start start injecting 150mg subcu every 4 weeks.  spironolactone (ALDACTONE) 25 mg, Oral, DAILY    sulfaSALAzine (AZULFIDINE) 1,000 mg, Oral, 2 TIMES DAILY    tamsulosin (FLOMAX) 0.4 mg, Oral, DAILY       No Known Allergies    Review of Systems:   Constitutional: negative   Eyes: negative   Ears, nose, mouth, throat, and face: negative   Respiratory: negative   Cardiovascular: negative   Gastrointestinal: negative   Genitourinary: negative for bowel/bladder incontinence   Hematologic/lymphatic: negative   Musculoskeletal: back pain, multiple joint pain  Neurological: numbness/tingling in legs/feet, leg weakness and cramping  Behavioral/Psych: negative for anxiety/depression   Allergic/Immunologic: negative     Objective: There were no vitals taken for this visit. Constitutional: Pleasant, no acute distress   Head: Normocephalic, atraumatic   Eyes: Conjunctivae normal   Neck: Supple, symmetrical   Lungs: Normal respiratory effort, non-labored breathing   Cardiovascular: Limbs warm and well perfused   Abdomen: Non-protruded   Musculoskeletal: Muscle bulk symmetric, no atrophy, no gross deformities   · Lumbar Spine: ROM restricted with extension. Lumbar paraspinals tender bilaterally. SLR neg bilaterally. MIKAL neg bilaterally. GAENSLEN neg bilaterally. Positive facet loading bilaterally. Bilateral SI joints non-tender to palpation. Neurological: Cranial nerves II-XII grossly intact. · Gait - Slightly antalgic gait. Ambulates without assist device. · Motor: No focal motor deficit  Skin: No rashes or lesions present   Psychological: Cooperative, no exaggerated pain behaviors       Assessment:    Diagnosis Orders   1.  Lumbar facet joint pain  MT RADIOFREQUENCY NEUROTOMY LUMBAR OR SACRAL, W IMAGE GUIDANCE, SINGLE    MT RADIOFREQ NEUROTOMY LUMBAR OR SACRAL, W IMAGE GUIDE,EA ADDL LEVEL    CHG FLUOR NEEDLE/CATH SPINE/PARASPINAL DX/THER ADDON FLUORO FOR SURGICAL PROCEDURES   2. Other chronic pain     3. Chronic bilateral low back pain with bilateral sciatica     4. Neuropathic pain     5. Polyarthralgia         Lucien Bond is a 63 y.o.male presenting to the pain clinic for evaluation of chronic low back, bilateral leg, and bilateral feet pain. Due to his axial spondyloarthropathy he does have advanced arthritis in his lumbar spine as evident on x-ray. He does have a large skill skeletal and neuropathic component to his pain. He does have a clinical history and physical examination consistent with lumbar facet mediated pain and significant response to diagnostic and confirmatory bilateral lumbar medial branch blocks, we will proceed with radiofrequency ablation targeting the bilateral facet joints of L4/L5 and L5/S1. In addition, the patient is tolerating Cymbalta 60 mg and has noted benefit on this dose. Plan: The following treatment recommendations and plan were discussed in detail with Lucien Bond. Imaging:   I have reviewed patients imaging of XR L-spine and new MRI L-spine. Analgesics:   Patient is taking Naproxen. Patient is advised to take as prescribed and not take on an empty stomach. Adjuvants: With associated musculoskeletal neuropathic pain, we will continue Cymbalta 60 mg/day as he has noticed significant benefit on this medication. Interventions: In presence of axial low back and with physical exam consistent for lumbar  facetal pain and significant response to diagnostic and confirmatory lumbar medial branch blocks, we will proceed with radiofrequency ablation at bilateral L4, L5, and S1 have been ordered. Anticoagulation/NPO Recommendations:   Please hold Naproxen (Aleve) x 7 days prior to day of procedure. Patient does NOT NEED to be NPO for procedure.     Multidisciplinary Pain Management:   In the presence of complex, chronic, and multi-factorial pain, the importance of a multidisciplinary approach to pain management in the patients management regimen was emphasized and discussed in great detail. PHYSICAL THERAPY: Patient is advised to see a physical therapist for gentle stretching exercises and conditioning exercises for management of pain.      Referrals:  None    Prescriptions Written This Visit:   None    Follow-up: For lumbar RFA      Archie Glover, DO  Interventional Pain Management/PM&R   [place]

## 2020-11-03 ENCOUNTER — HOSPITAL ENCOUNTER (OUTPATIENT)
Age: 60
Setting detail: OUTPATIENT SURGERY
Discharge: HOME | End: 2020-11-03
Attending: ANESTHESIOLOGY | Admitting: ANESTHESIOLOGY
Payer: COMMERCIAL

## 2020-11-03 ENCOUNTER — APPOINTMENT (OUTPATIENT)
Dept: GENERAL RADIOLOGY | Age: 60
End: 2020-11-03
Attending: ANESTHESIOLOGY
Payer: COMMERCIAL

## 2020-11-03 VITALS
OXYGEN SATURATION: 96 % | RESPIRATION RATE: 16 BRPM | SYSTOLIC BLOOD PRESSURE: 108 MMHG | HEIGHT: 73 IN | DIASTOLIC BLOOD PRESSURE: 55 MMHG | WEIGHT: 253 LBS | BODY MASS INDEX: 33.53 KG/M2 | HEART RATE: 60 BPM | TEMPERATURE: 97.1 F

## 2020-11-03 PROCEDURE — 3209999900 FLUORO FOR SURGICAL PROCEDURES

## 2020-11-03 PROCEDURE — 2500000003 HC RX 250 WO HCPCS: Performed by: ANESTHESIOLOGY

## 2020-11-03 PROCEDURE — 3600000054 HC PAIN LEVEL 3 BASE: Performed by: ANESTHESIOLOGY

## 2020-11-03 PROCEDURE — 64636 DESTROY L/S FACET JNT ADDL: CPT | Performed by: ANESTHESIOLOGY

## 2020-11-03 PROCEDURE — 64635 DESTROY LUMB/SAC FACET JNT: CPT | Performed by: ANESTHESIOLOGY

## 2020-11-03 PROCEDURE — 2709999900 HC NON-CHARGEABLE SUPPLY: Performed by: ANESTHESIOLOGY

## 2020-11-03 PROCEDURE — 7100000011 HC PHASE II RECOVERY - ADDTL 15 MIN: Performed by: ANESTHESIOLOGY

## 2020-11-03 PROCEDURE — 3600000055 HC PAIN LEVEL 3 ADDL 15 MIN: Performed by: ANESTHESIOLOGY

## 2020-11-03 PROCEDURE — 7100000010 HC PHASE II RECOVERY - FIRST 15 MIN: Performed by: ANESTHESIOLOGY

## 2020-11-03 RX ORDER — LIDOCAINE HYDROCHLORIDE 20 MG/ML
INJECTION, SOLUTION EPIDURAL; INFILTRATION; INTRACAUDAL; PERINEURAL PRN
Status: DISCONTINUED | OUTPATIENT
Start: 2020-11-03 | End: 2020-11-03 | Stop reason: ALTCHOICE

## 2020-11-03 RX ORDER — LIDOCAINE HYDROCHLORIDE 10 MG/ML
INJECTION, SOLUTION EPIDURAL; INFILTRATION; INTRACAUDAL; PERINEURAL PRN
Status: DISCONTINUED | OUTPATIENT
Start: 2020-11-03 | End: 2020-11-03 | Stop reason: ALTCHOICE

## 2020-11-03 ASSESSMENT — PAIN SCALES - GENERAL: PAINLEVEL_OUTOF10: 0

## 2020-11-03 ASSESSMENT — PAIN - FUNCTIONAL ASSESSMENT: PAIN_FUNCTIONAL_ASSESSMENT: 0-10

## 2020-11-03 ASSESSMENT — PAIN DESCRIPTION - DESCRIPTORS: DESCRIPTORS: ACHING;CONSTANT

## 2020-11-03 NOTE — PROCEDURES
Pre-operative Diagnosis: Lumbar facet pain     Post-operative Diagnosis: Lumbar facet pain     Procedure: Lumbar thermal radiofrequency ablation targeting bilateral L4/L5 and L5/S1 facet joints    Procedure Description:  After consent was obtained, the patient was placed in the prone position having pressure points appropriately padded. The lower back was prepped with chloraprep and draped in a sterile fashion. 0.5 cc of 1 % lidocaine was used for local anesthesia of the skin and superficial subcutaneous tissues. Three 22-gauge 100mm SMK cannula with a 10-mm active tip was advanced under fluoroscopy in an oblique view with caudad angulation on to junction of the right superior articular process and the transverse process of the L4 and L5 vertebra and sacral ala. There were no paresthesias, heme or CSF obtained. Needle placement was confirmed using AP and oblique views. Sensory and motor stimulation at 50Hz and 2Hz and impedance measurements were carried out having reached threshold on the right in L4 at 0.4V/3V/160 Ohms, L5 at 0.3V/3V/150 Ohms, and SA at 0.6V/3V/180 Ohms. Then,  1 cc of 2 % Lidocaine was injected at the site. Temperature was then raised to 80 degrees centigrade for 90 seconds with a 15 second temperature ramp. No pain was reported during the lesioning. The needles were then withdrawn without complications. The procedure was repeated at L4, L5, and SA on the left side. Sensory and motor stimulation at 50Hz and 2Hz and impedance measurements were carried out again having reached threshold on the left in L4 at 0.3V/3V/165Ohms, L5 at 0.4V/3V/180 Ohms, and SA at 0.7V/3V/200 Ohms. Then,  1 cc of 2 % Lidocaine was injected at the site. Temperature was raised to 80 degrees centigrade for 90 seconds with a 15 second temperature ramp. No pain was reported during the lesioning.  The patient tolerated the procedure well and was transported to the recovery room where he was observed for 15 minutes to then be discharged in ambulatory fashion.     Procedural Complications: None       Marco Antonio Inman, DO  Interventional Pain Management/PM&R   [place]

## 2020-11-03 NOTE — PROGRESS NOTES
0934-Patient to Phase II via cart. Report received from [employer]. Patient awake and alert-no sedation. Vitals obtained and stable. Respirations even and unlabored on room air. Patient denies pain, nausea, numbness and tingling. Patient able to move all extremities. No drainage noted at injection sites. Patient instructed to stay in bed. Instructed on call light use. 0938-Patient denies wanting snack and drink. Patient getting dressed at bedside. 0950-Patient discharged in stable condition. All belongings given to patient. Patient ambulated to car with assistance from RN. Patient tolerated well.

## 2020-12-23 NOTE — TELEPHONE ENCOUNTER
Last seen: 10/21/2020.      Follow-up:   Future Appointments   Date Time Provider Iesha Granger   1/7/2021  3:00 PM DO VIK Caldera [facility] Rheum [facility] [address] [place] [address]   4/21/2021  4:00 PM DO VIK Abarca [facility] Pain [address]

## 2020-12-28 RX ORDER — DULOXETIN HYDROCHLORIDE 60 MG/1
60 CAPSULE, DELAYED RELEASE ORAL DAILY
Qty: 30 CAPSULE | Refills: 2 | Status: SHIPPED | OUTPATIENT
Start: 2020-12-28 | End: 2021-03-23

## 2021-01-07 ENCOUNTER — OFFICE VISIT (OUTPATIENT)
Dept: RHEUMATOLOGY | Age: 61
End: 2021-01-07
Payer: COMMERCIAL

## 2021-01-07 VITALS
SYSTOLIC BLOOD PRESSURE: 96 MMHG | HEART RATE: 93 BPM | WEIGHT: 278.6 LBS | BODY MASS INDEX: 36.92 KG/M2 | DIASTOLIC BLOOD PRESSURE: 68 MMHG | HEIGHT: 73 IN | OXYGEN SATURATION: 96 % | TEMPERATURE: 98.1 F

## 2021-01-07 DIAGNOSIS — M54.41 CHRONIC MIDLINE LOW BACK PAIN WITH BILATERAL SCIATICA: ICD-10-CM

## 2021-01-07 DIAGNOSIS — R20.2 PARESTHESIA: ICD-10-CM

## 2021-01-07 DIAGNOSIS — M54.42 CHRONIC MIDLINE LOW BACK PAIN WITH BILATERAL SCIATICA: ICD-10-CM

## 2021-01-07 DIAGNOSIS — M46.90 AXIAL SPONDYLOARTHRITIS (HCC): Primary | ICD-10-CM

## 2021-01-07 DIAGNOSIS — G89.29 CHRONIC MIDLINE LOW BACK PAIN WITH BILATERAL SCIATICA: ICD-10-CM

## 2021-01-07 DIAGNOSIS — Z51.81 MEDICATION MONITORING ENCOUNTER: ICD-10-CM

## 2021-01-07 PROCEDURE — 99214 OFFICE O/P EST MOD 30 MIN: CPT | Performed by: INTERNAL MEDICINE

## 2021-01-07 RX ORDER — SECUKINUMAB 150 MG/ML
INJECTION SUBCUTANEOUS
Qty: 5 ML | Refills: 1 | Status: SHIPPED | OUTPATIENT
Start: 2021-01-07 | End: 2021-02-01 | Stop reason: SDUPTHER

## 2021-01-07 ASSESSMENT — ENCOUNTER SYMPTOMS
VOMITING: 0
EYES NEGATIVE: 1
NAUSEA: 0
COUGH: 0
EYE PAIN: 0
WHEEZING: 0
EYE REDNESS: 0
SHORTNESS OF BREATH: 0
DIARRHEA: 0
CONSTIPATION: 0
BACK PAIN: 1

## 2021-01-07 NOTE — PROGRESS NOTES
Per Dr Mederos Arthur verbal order #2 boxes of cosentyx 150 mg per dose total of 4 doses.      Lot [id number]  Exp 1/22

## 2021-01-07 NOTE — PROGRESS NOTES
[facility] RHEUMATOLOGY FOLLOW UP NOTE       Date Of Service: 1/7/2021  Provider: Lazaro Gill DO    Name: Minerva Mason   MRN: [id number]    Chief Complaint(s)     Chief Complaint   Patient presents with    Follow-up     4 month  axial spondyloarthritis        History of Present Illness (HPI)     Minerva Mason  is a(n)60 y.o. male with a hx of hx of HTN, neuropathy, GERD, DDD cervical spine, BPH, venous stasis here for the f/u evaluation of osteoarthritis, axial spondyloarthritis, bilateral foot pain. Back pain significantly improved with RFA's performed by Dr. Osman White. Continued bilateral hands, wrists, right hip, bilatearl knees, ankles, feet,    Pain up to 4/10 over the past week, constant == feet, legs, and lower back. Timing: mornings and evenings after sitting. Aggravating factors: wt bearing, inactivity  Alleviating factors: movement. Mild Paresthesia/tingling in the legs intemrittently occurring. Cramping of feet with flxion of toes. Balance issues b/c numbness of feet. REVIEW OF SYSTEMS: (ROS)    Review of Systems   Constitutional: Negative for diaphoresis, fatigue and fever. HENT: Negative for congestion, hearing loss and nosebleeds. Eyes: Negative. Negative for pain and redness. Respiratory: Negative for cough, shortness of breath and wheezing. Cardiovascular: Negative. Negative for chest pain. Gastrointestinal: Negative for constipation, diarrhea, nausea and vomiting. Genitourinary: Negative for difficulty urinating, frequency and hematuria. Musculoskeletal: Positive for arthralgias, back pain and myalgias. Skin: Negative for rash. Neurological: Positive for numbness. Negative for dizziness, weakness and headaches. Hematological: Does not bruise/bleed easily. Psychiatric/Behavioral: Negative for sleep disturbance. The patient is not nervous/anxious.           PAST MEDICAL HISTORY has a past medical history of GERD (gastroesophageal reflux disease), Hypertension, Neuropathy, and Osteoarthritis. PAST SURGICAL HISTORY     has a past surgical history that includes knee surgery; Foot surgery (2009); lipoma resection (2010); Rotator cuff repair (Left, 08/2019); Colonoscopy (12/2019); Pain management procedure (Bilateral, 9/22/2020); Pain management procedure (Bilateral, 10/13/2020); and Pain management procedure (Bilateral, 11/3/2020). [facility] FAMILY HISTORY      Family History   Problem Relation Age of Onset    Cancer Mother         brain    High Blood Pressure Father     High Blood Pressure Brother     Diabetes Brother        SOCIAL HISTORY     reports that he has never smoked. He has never used smokeless tobacco. He reports current alcohol use of about 12.0 standard drinks of alcohol per week. He reports that he does not use drugs. ALLERGIES   No Known Allergies    CURRENT MEDICATIONS      Current Outpatient Medications:     DULoxetine (CYMBALTA) 60 MG extended release capsule, Take 1 capsule by mouth daily, Disp: 30 capsule, Rfl: 2    secukinumab, 300 MG Dose, (COSENTYX SENSOREADY, 300 MG,) 150 MG/ML SOAJ, Inject 150mg subcu weekly for 5 weeks then 4 weeks start start injecting 150mg subcu every 4 weeks. , Disp: 5 mL, Rfl: 1    sulfaSALAzine (AZULFIDINE) 500 MG tablet, Take 2 tablets by mouth 2 times daily, Disp: 120 tablet, Rfl: 0    cetirizine (ZYRTEC ALLERGY) 10 MG tablet, Take 10 mg by mouth daily, Disp: , Rfl:     tamsulosin (FLOMAX) 0.4 MG capsule, Take 0.4 mg by mouth daily, Disp: , Rfl:     spironolactone (ALDACTONE) 25 MG tablet, Take 25 mg by mouth daily, Disp: , Rfl:     pantoprazole (PROTONIX) 40 MG tablet, Take 40 mg by mouth daily. , Disp: , Rfl:     lisinopril-hydrochlorothiazide (PRINZIDE;ZESTORETIC) 20-12.5 MG per tablet, Take 1 tablet by mouth 2 times daily.   , Disp: , Rfl:   metoprolol (TOPROL-XL) 50 MG XL tablet, Take 50 mg by mouth daily. , Disp: , Rfl:     aspirin 81 MG EC tablet, Take 81 mg by mouth daily. , Disp: , Rfl:     naproxen (NAPROSYN) 500 MG tablet, Take 500 mg by mouth 2 times daily (with meals). , Disp: , Rfl:     PHYSICAL EXAMINATION / OBJECTIVE     Objective:  BP 96/68 (Site: Right Upper Arm, Position: Sitting, Cuff Size: Medium Adult)   Pulse 93   Temp 98.1 °F (36.7 °C)   Ht 6' 0.99\" (1.854 m)   Wt 278 lb 9.6 oz (126.4 kg)   SpO2 96%   BMI 36.76 kg/m²     General Appearance: General appearance:  AAO x 3 ,  well-developed and well nourished  Head: normocephalic and atraumatic  Eyes: No gross abnormalities. , PERRL,  Sclera nonicteric  ENT:  MMM, NON-tender, ears w/o deformities  Neck: supple ,  non tender   Lymph: no cervical nodes and no supraclavicular nodes  Pulmonary/Chest: CTA bilateral ,  normal air movement, no respiratory distress  Cardiovascular: normal rate, normal S1 and S2, NO murmur, rub, gallop. Edema   : Deferred   Abd/GI: Deferred   Neurologic:  speech normal,   Skin:  + faint small erythematous plaques along the bilateral lower extremities. Extremities: no cyanosis and no clubbing ,   Musculoskeletal:    Upper extremities:    SHOULDERS, ELBOWS NT, NS  WRISTS  NT, NS   HANDS/FINGERS tender 2-3 MCPs bilat. Lower extremities:    HIPS NT, NS               KNEES NT, NS              ANKLES NT. NS            FEET : tender MTPs, metatarsal , mild sole  . Bunion bilat. Spine:  tener lumbar spine, sacral spine. KEY:  Tender :  T  Swelling: S  Deformities: D  Non-tender : NT  No swelling: NS        LABS      CBC  Lab Results   Component Value Date    WBC 4.3 08/27/2020    WBC 6.1 06/08/2020    WBC 4.6 05/01/2020    RBC 4.12 08/27/2020    HGB 12.2 08/27/2020    HGB 13.2 06/08/2020    HGB 14.2 05/01/2020    HCT 38.3 08/27/2020    MCV 93.0 08/27/2020     08/27/2020     06/08/2020     05/01/2020       CMP Lumbar spinal stenosis w/ moderate foraminal stenosis. - pt w/ pseudoclaudication sx's but intact DTR's and muscle strength. + fall 15 feet 26 years ago w/ 3 broken. PRIOR tx:  gabapentin 300 mg BID (no relief), elavil. , RFA - improvement of sx's. 2020.    - cymbalta 60mg daily      Medication monitoring               - TB gold negative (1/23/2020)               - CBC, CMP, sed rate, CRP  q 12 weeks.      Health Maintenance                - prevnar 13 (4/30/2020)      Return in about 3 months (around 4/7/2021). Electronically signed by Shaquille Gruber DO on 1/7/2021 at 3:20 PM    New Prescriptions    No medications on file       Thank you for allowing me to participate in the care of this patient. Please call if there are any questions.

## 2021-03-04 ENCOUNTER — IMMUNIZATION (OUTPATIENT)
Dept: PRIMARY CARE CLINIC | Age: 61
End: 2021-03-04
Payer: COMMERCIAL

## 2021-03-04 PROCEDURE — 0001A COVID-19, PFIZER VACCINE 30MCG/0.3ML DOSE: CPT

## 2021-03-04 PROCEDURE — 91300 COVID-19, PFIZER VACCINE 30MCG/0.3ML DOSE: CPT

## 2021-03-23 RX ORDER — DULOXETIN HYDROCHLORIDE 60 MG/1
60 CAPSULE, DELAYED RELEASE ORAL DAILY
Qty: 30 CAPSULE | Refills: 0 | Status: SHIPPED | OUTPATIENT
Start: 2021-03-23 | End: 2021-04-21 | Stop reason: DRUGHIGH

## 2021-03-25 ENCOUNTER — IMMUNIZATION (OUTPATIENT)
Dept: PRIMARY CARE CLINIC | Age: 61
End: 2021-03-25
Payer: COMMERCIAL

## 2021-03-25 PROCEDURE — 91300 COVID-19, PFIZER VACCINE 30MCG/0.3ML DOSE: CPT | Performed by: PHARMACIST

## 2021-03-25 PROCEDURE — 0002A COVID-19, PFIZER VACCINE 30MCG/0.3ML DOSE: CPT | Performed by: PHARMACIST

## 2021-04-05 ENCOUNTER — OFFICE VISIT (OUTPATIENT)
Dept: RHEUMATOLOGY | Age: 61
End: 2021-04-05
Payer: COMMERCIAL

## 2021-04-05 VITALS
DIASTOLIC BLOOD PRESSURE: 68 MMHG | TEMPERATURE: 97.7 F | HEIGHT: 73 IN | WEIGHT: 265.8 LBS | HEART RATE: 83 BPM | SYSTOLIC BLOOD PRESSURE: 128 MMHG | BODY MASS INDEX: 35.23 KG/M2 | OXYGEN SATURATION: 97 %

## 2021-04-05 DIAGNOSIS — Z51.81 MEDICATION MONITORING ENCOUNTER: ICD-10-CM

## 2021-04-05 DIAGNOSIS — M54.41 CHRONIC MIDLINE LOW BACK PAIN WITH BILATERAL SCIATICA: ICD-10-CM

## 2021-04-05 DIAGNOSIS — M46.90 AXIAL SPONDYLOARTHRITIS (HCC): Primary | ICD-10-CM

## 2021-04-05 DIAGNOSIS — G89.29 CHRONIC MIDLINE LOW BACK PAIN WITH BILATERAL SCIATICA: ICD-10-CM

## 2021-04-05 DIAGNOSIS — M54.42 CHRONIC MIDLINE LOW BACK PAIN WITH BILATERAL SCIATICA: ICD-10-CM

## 2021-04-05 PROCEDURE — 99214 OFFICE O/P EST MOD 30 MIN: CPT | Performed by: NURSE PRACTITIONER

## 2021-04-05 ASSESSMENT — ENCOUNTER SYMPTOMS
TROUBLE SWALLOWING: 0
ABDOMINAL PAIN: 0
EYE ITCHING: 0
NAUSEA: 0
CONSTIPATION: 0
SHORTNESS OF BREATH: 0
DIARRHEA: 0
COUGH: 0
BACK PAIN: 1
EYE PAIN: 0

## 2021-04-05 NOTE — PROGRESS NOTES
[facility] RHEUMATOLOGY FOLLOW UP NOTE       Date Of Service: 4/5/2021  Provider: LOYDA Church CNP    Name: Tarun Mohamud   MRN: [id number]    Chief Complaint(s)     Chief Complaint   Patient presents with    3 Month Follow-Up        History of Present Illness (HPI)     Tarun Mohamud  is a(n)60 y.o. male with a hx of HTN, neuropathy, GERD, DDD cervical spine, BPH, venous stasis here for the f/u evaluation of polyarthralgia, bilateral foot pain. Interval hx:    - on cosentyx for 3 months with no relief    pain affecting the hands, wrists, left shoulder, knees, toes, neck  Pain on a scale 0-10: 5/10  Type of pain: constant  Timing:afternoon  Aggravating factors: increased use, wt bearing  Alleviating factors: n/a    Associated symptoms:  denies swelling/  Redness/ warmth, + AM stiffness lasting ~ 30 minutes        REVIEW OF SYSTEMS: (ROS)    Review of Systems   Constitutional: Negative for fatigue, fever and unexpected weight change. HENT: Negative for congestion and trouble swallowing. Eyes: Negative for pain and itching. Respiratory: Negative for cough and shortness of breath. Cardiovascular: Negative for chest pain and leg swelling. Gastrointestinal: Negative for abdominal pain, constipation, diarrhea and nausea. Endocrine: Negative for cold intolerance and heat intolerance. Genitourinary: Negative for difficulty urinating, frequency and urgency. Musculoskeletal: Positive for arthralgias, back pain and myalgias. Negative for joint swelling. Skin: Negative for rash. Neurological: Positive for numbness. Negative for dizziness, weakness and headaches. Psychiatric/Behavioral: Positive for sleep disturbance. The patient is not nervous/anxious.         PAST MEDICAL HISTORY      Past Medical History:   Diagnosis Date    GERD (gastroesophageal reflux disease)     Hypertension     Neuropathy     Osteoarthritis        PAST SURGICAL HISTORY      Past Surgical History: Procedure Laterality Date    COLONOSCOPY  12/2019    FOOT SURGERY  2009   [facility] KNEE SURGERY      1997, 2008 (right) 2007 (Left)   [address] RESECTION  2010    Chest    PAIN MANAGEMENT PROCEDURE Bilateral 9/22/2020    medial branch blocks at bilateral L4, L5, and S1 performed by Tona Castaneda DO at [address] [place] [address] PAIN MANAGEMENT PROCEDURE Bilateral 10/13/2020    medial branch blocks at bilateral L4, L5, and S1 performed by Tona Castaneda DO at [address] [place] [address] PAIN MANAGEMENT PROCEDURE Bilateral 11/3/2020    radiofrequency ablation at bilateral L4, L5, and S1 [facility] 08/2019       FAMILY HISTORY      Family History   Problem Relation Age of Onset    Cancer Mother         brain    High Blood Pressure Father     High Blood Pressure Brother     Diabetes Brother        SOCIAL HISTORY      Social History     Tobacco History     Smoking Status  Never Smoker    Smokeless Tobacco Use  Never Used          Alcohol History     Alcohol Use Status  Yes Drinks/Week  12 Cans of beer per week Amount  12.0 standard drinks of alcohol/wk          Drug Use     Drug Use Status  Never          Sexual Activity     Sexually Active  Not Asked                ALLERGIES   No Known Allergies    CURRENT MEDICATIONS      Current Outpatient Medications   Medication Sig Dispense Refill    DULoxetine (CYMBALTA) 60 MG extended release capsule Take 1 capsule by mouth daily 30 capsule 0    secukinumab, 300 MG Dose, (COSENTYX SENSOREADY, 300 MG,) 150 MG/ML SOAJ Inject 150mg subcu weekly for 5 weeks then 4 weeks start start injecting 150mg subcu every 4 weeks.  (Patient not taking: Reported on 4/5/2021) 5 mL 1    cetirizine (ZYRTEC ALLERGY) 10 MG tablet Take 10 mg by mouth daily      tamsulosin (FLOMAX) 0.4 MG capsule Take 0.4 mg by mouth daily      spironolactone (ALDACTONE) 25 MG tablet Take 25 mg by mouth daily      pantoprazole (PROTONIX) 40 MG tablet Take 40 mg by mouth daily.  lisinopril-hydrochlorothiazide (PRINZIDE;ZESTORETIC) 20-12.5 MG per tablet Take 1 tablet by mouth 2 times daily.  metoprolol (TOPROL-XL) 50 MG XL tablet Take 50 mg by mouth daily.  aspirin 81 MG EC tablet Take 81 mg by mouth daily.  naproxen (NAPROSYN) 500 MG tablet Take 500 mg by mouth 2 times daily (with meals). No current facility-administered medications for this visit. PHYSICAL EXAMINATION / OBJECTIVE   Objective:  /68 (Site: Right Upper Arm, Position: Sitting, Cuff Size: Medium Adult)   Pulse 83   Temp 97.7 °F (36.5 °C)   Ht 6' 0.99\" (1.854 m)   Wt 265 lb 12.8 oz (120.6 kg)   SpO2 97%   BMI 35.08 kg/m²     Physical Exam  Vitals signs reviewed. Constitutional:       Appearance: He is well-developed. Neck:      Musculoskeletal: Normal range of motion and neck supple. Cardiovascular:      Rate and Rhythm: Normal rate and regular rhythm. Pulmonary:      Effort: Pulmonary effort is normal.      Breath sounds: Normal breath sounds. Abdominal:      Palpations: Abdomen is soft. Tenderness: There is no abdominal tenderness. Skin:     General: Skin is warm and dry. Findings: No rash. Neurological:      Mental Status: He is alert and oriented to person, place, and time. Deep Tendon Reflexes: Reflexes are normal and symmetric. Psychiatric:         Thought Content: Thought content normal.       Musculoskeletal:     Normal gait.      Strength 5/5 in biceps, triceps, hips, knees,      Upper extremities:   Shoulders:  Nontender, no swelling  Elbows:      Nontender, no swelling,  Wrists       Tender bilat, no swelling,  Hands:      Nontender, no swelling,     Lower extremities:  Hips:       Nontender, no swelling,  Knees :   +tender right knee, no swelling/warmth  Ankles:   Tender bilat, no swelling,  Feet:       + MTP compression, no swelling      RAPID 3:   4/5/2021 --- RAPID 3: 2.3 + 5 + 2 = 9.3     Remission: <3  Low Disease Activity: <6  Moderate Disease Activity: >=6 and <=12  High Disease Activity: >12     LABS    CBC  Lab Results   Component Value Date    WBC 4.3 08/27/2020    RBC 4.12 08/27/2020    HGB 12.2 08/27/2020    HCT 38.3 08/27/2020    MCV 93.0 08/27/2020    MCH 29.6 08/27/2020    MCHC 31.9 08/27/2020     08/27/2020       CMP  Lab Results   Component Value Date    CALCIUM 9.3 08/27/2020    LABALBU 4.2 08/27/2020    PROT 7.0 08/27/2020     08/27/2020    K 4.0 08/27/2020    CO2 25 08/27/2020     08/27/2020    BUN 22 08/27/2020    CREATININE 1.1 08/27/2020    ALKPHOS 55 08/27/2020    ALT 18 08/27/2020    AST 15 08/27/2020       HgBA1c: No components found for: HGBA1C    No results found for: VITD25      Lab Results   Component Value Date    ANASCRN Detected (A) 10/16/2019     Lab Results   Component Value Date    SSA SEE BELOW 10/16/2019     Lab Results   Component Value Date    SSB 0 10/16/2019     No results found for: ANTI-SMITH  No results found for: DSDNAAB   No results found for: ANTIRNP  No results found for: C3, C4  Lab Results   Component Value Date    CCPAB 5 10/16/2019     Lab Results   Component Value Date    RF 15 (H) 10/16/2019       No components found for: CANCASCRN, APANCASCRN  Lab Results   Component Value Date    SEDRATE 14 (H) 08/27/2020     Lab Results   Component Value Date    CRP 0.09 08/27/2020       RADIOLOGY:         ASSESSMENT/PLAN    Assessment   Plan     Undiff axial spondyloarthropathy  - hand pain w/ CTS sx's, foot pain s/p 4 surgeries w/ continued pain, back pain since 50's w/ associated radicular sx's. Reported joint swelling left toes and fingers bilat. AM stiffness > 60 mins. + fatigue, + dry eyes, nail pitting bilateral hands, onycholysis toenails.  Exam w/ + joint swelling MCPs and left knee warmth  - prior tx: prednisone (no relief), enbrel (no relief), cosentyx 150 mg (no relief)   - mobic 15 mg daily PRN   - declines titration of

## 2021-04-21 ENCOUNTER — OFFICE VISIT (OUTPATIENT)
Dept: PHYSICAL MEDICINE AND REHAB | Age: 61
End: 2021-04-21
Payer: COMMERCIAL

## 2021-04-21 VITALS
HEIGHT: 72 IN | SYSTOLIC BLOOD PRESSURE: 112 MMHG | BODY MASS INDEX: 35.89 KG/M2 | WEIGHT: 265 LBS | DIASTOLIC BLOOD PRESSURE: 68 MMHG

## 2021-04-21 DIAGNOSIS — M54.59 LUMBAR FACET JOINT PAIN: Primary | ICD-10-CM

## 2021-04-21 DIAGNOSIS — M25.50 POLYARTHRALGIA: ICD-10-CM

## 2021-04-21 DIAGNOSIS — G89.29 OTHER CHRONIC PAIN: ICD-10-CM

## 2021-04-21 DIAGNOSIS — M79.2 NEUROPATHIC PAIN: ICD-10-CM

## 2021-04-21 PROCEDURE — 99214 OFFICE O/P EST MOD 30 MIN: CPT | Performed by: ANESTHESIOLOGY

## 2021-04-21 RX ORDER — DULOXETIN HYDROCHLORIDE 30 MG/1
30 CAPSULE, DELAYED RELEASE ORAL DAILY
Qty: 30 CAPSULE | Refills: 3 | Status: SHIPPED | OUTPATIENT
Start: 2021-04-21 | End: 2021-08-25

## 2021-04-21 NOTE — PATIENT INSTRUCTIONS
The following treatment recommendations and plan were discussed in detail with Reed Tinsley. Imaging:   I have reviewed patients imaging of XR L-spine and new MRI L-spine. Analgesics:   Patient is taking Naproxen. Patient is advised to take as prescribed and not take on an empty stomach. Adjuvants: With associated myofascial pain, we will decrease his Cymbalta to 30 mg daily. Interventions:   No interventions pursued at this visit. Anticoagulation/NPO Recommendations:   No interventions pursued at this visit. Multidisciplinary Pain Management:   In the presence of complex, chronic, and multi-factorial pain, the importance of a multidisciplinary approach to pain management in the patients management regimen was emphasized and discussed in great detail. WEIGHT LOSS: I discussed with the patient about 12-hour intermittent fasting, making sure he is taking daily weights on his scale, setting a goal, and we will looking for a 30 pound weight loss at our follow-up visit. Patient wants to hold off on a referral to a dietitian.     Referrals:  None    Prescriptions Written This Visit:   Cymbalta    Follow-up: 4 months

## 2021-04-21 NOTE — PROGRESS NOTES
Chronic Pain/PM&R Clinic Note     Encounter Date: 4/21/21    Subjective:   Chief Complaint:   Chief Complaint   Patient presents with    Follow Up After Procedure    Medication Refill     Cymbalta       History of Present Illness:   Stephan Dan is a 63 y.o. male seen in the clinic initially on 09/04/20 upon request from Malvin Craig DO (Rheumatology) for his history of chronic low back pain. He has history of axial spondyloarthropathy. He has multiple pain complaints. He states that his feet are most bothersome to him and that this is been going on for greater than 15 years. In addition, he states that his low back and bilateral leg pain has progressively getting worse over 5 years. He does not have any recent trauma/fall/inciting event however he did sustain a fall from height greater than 15 feet over 20 years ago. He describes low back pain as midline with radiation towards both flanks. The pain does radiate down both legs on the posterior aspect of the thighs and into the posterior aspect of the calves and plantar aspects of the feet. He has associated numbness/tingling in both legs and feet diffusely throughout. He states that he feels like his legs have lost some strength and has difficulty with balance. In addition he states it is becoming increasingly difficult to walk further than a mile due to the pain in his feet and low back. His pain is exacerbated practically when he sits down to rest and at nighttime. He does have difficulty with sleep. He states that there is not much that does improve his pain, and he feels like the medications he is currently taking are not providing much relief. He denies any saddle anesthesia or bowel/bladder incontinence. Of note, he has an MRI L-spine performed in 2013 with multilevel degenerative changes with associated multilevel spinal stenosis, neuroforaminal stenosis and facet arthropathy.   He states that he had previous lumbar epidural injections well over 5 years ago with no response. He states that he has not been enrolled in physical therapy recently but he does endorse doing daily stretching prior to work. When discussing his leg numbness/tingling, he denies any history of diabetes, vitamin deficiencies particularly B12, excessive alcohol usage in his lifetime. He denies any family history that is significant for any neuropathic pain or neuropathy. Today, 4/21/2021, patient comes for plan follow-up for chronic low back pain and after his lumbar thermal radiofrequency ablation performed on 11/3/2020. He reports 100% relief in his low back pain since his injection. He has been doing very well overall. He states that he was noticing improvements in his pain with the Cymbalta at 30 mg and not the 60 mg dose. He is interested in decreasing this dose. He is interested in working on weight loss efforts and wants to try to get down to 235 pounds. He has been working on his diet changes with his wife since she has stopped smoking. He denies any new symptoms at this point. History of Intervention:   Surgery: No previous lumbar surgeries  Injections: Previous lumbar epidurals? - did not get much relief  Diagnostic lumbar MBBs (9/22/20)-90% relief  Confirmatory lumbar MBBs (10/13/2020)95% relief  Lumbar RFA (11/3/2020)100% relief X 5 months    Current Treatment Medications:   cosentyx inj - awaiting insurance approval  Sulfasalazine 1000 mg BID  Naproxen 500 mg PRN  Cymbalta 60 mg daily    Historical Treatment Medications:   Gabapentin 300 mg BID  Elavil - stopped due to interaction with Cymbalta    Imaging:  MRI L-spine (9/22/20)    FINDINGS:    There is minimal, grade 1, retrolisthesis of L2 relative to L3 and L3 relative to L4 on the basis of degenerative change. This is stable compared to prior exams. There is otherwise anatomic vertebral body height and alignment.  There are small Schmorl's    nodes at the superior and inferior endplates of L1, stable compared to prior MRI. There are few scattered areas of hyperintense T1 and T2 signal in the lumbar spinal column as evidence for hemangiomas, similar to prior MRI. Marrow signal is otherwise    within normal limits. The conus terminates in a normal fashion at the L1-2 level. There are parapelvic cysts in the bilateral kidneys. Paraspinal soft tissues are otherwise unremarkable. At T12-L1 there is a central protrusion without significant spinal canal or neuroforaminal stenosis. At L1-2 there is no significant spinal canal or neuroforaminal stenosis. At L2-3 there is minimal partial uncovering the disc with superimposed shallow disc bulge which mildly indents thecal sac but does not contact traversing nerve roots. There is moderate right and mild left neural foraminal stenosis in association with    facet hypertrophy and ligamentum flavum thickening, similar to prior exam.    At L3-4 there is minimal partial uncovering the disc with superimposed shallow disc bulge which mildly indents thecal sac and may contact traversing L4 nerve roots bilaterally and contributes to mild spinal canal stenosis in association with facet    hypertrophy and ligamentum flavum thickening, similar to prior exam. There is mild to moderate left and moderate right neuroforaminal stenosis. At L4-5 there is a shallow disc bulge which mildly indents thecal sac but does not contact traversing nerve roots and contributes to mild spinal canal stenosis in association with facet hypertrophy and ligament flavum thickening which has progressed in    the interval. There is mild to moderate bilateral neural foraminal stenosis. At L5-S1 there is a shallow disc bulge which mildly indents thecal sac but does not contact traversing nerve roots.  There is moderate bilateral neural foraminal stenosis in association with facet hypertrophy and ligament flavum thickening, similar to    prior exam.              Impression Number of children: Not on file    Years of education: Not on file    Highest education level: Not on file   Occupational History    Not on file   Social Needs    Financial resource strain: Not on file    Food insecurity     Worry: Not on file     Inability: Not on file    Transportation needs     Medical: Not on file     Non-medical: Not on file   Tobacco Use    Smoking status: Never Smoker    Smokeless tobacco: Never Used   Substance and Sexual Activity    Alcohol use: Yes     Alcohol/week: 12.0 standard drinks     Types: 12 Cans of beer per week    Drug use: Never    Sexual activity: Not on file   Lifestyle    Physical activity     Days per week: Not on file     Minutes per session: Not on file    Stress: Not on file   Relationships    Social connections     Talks on phone: Not on file     Gets together: Not on file     Attends Judaism service: Not on file     Active member of club or organization: Not on file     Attends meetings of clubs or organizations: Not on file     Relationship status: Not on file    Intimate partner violence     Fear of current or ex partner: Not on file     Emotionally abused: Not on file     Physically abused: Not on file     Forced sexual activity: Not on file   Other Topics Concern    Not on file   Social History Narrative    Not on file       Medications & Allergies:   Current Outpatient Medications   Medication Instructions    aspirin 81 mg, DAILY    certolizumab pegol (CIMZIA PREFILLED) 2 X 200 MG/ML KIT injection Inject 400 mg subcu on weeks 0, 2, and 4 then every 4 weeks thereafter.     certolizumab pegol (CIMZIA PREFILLED) 400 mg, Subcutaneous, EVERY 28 DAYS, Start after loading dose    cetirizine (ZYRTEC ALLERGY) 10 mg, Oral, DAILY    DULoxetine (CYMBALTA) 30 mg, Oral, DAILY    lisinopril-hydrochlorothiazide (PRINZIDE;ZESTORETIC) 20-12.5 MG per tablet 1 tablet, 2 TIMES DAILY    metoprolol succinate (TOPROL XL) 50 mg, DAILY    naproxen (NAPROSYN) 500 mg, 2 TIMES DAILY WITH MEALS    pantoprazole (PROTONIX) 40 mg, DAILY    secukinumab, 300 MG Dose, (COSENTYX SENSOREADY, 300 MG,) 150 MG/ML SOAJ Inject 150mg subcu weekly for 5 weeks then 4 weeks start start injecting 150mg subcu every 4 weeks.  spironolactone (ALDACTONE) 25 mg, Oral, DAILY    tamsulosin (FLOMAX) 0.4 mg, Oral, DAILY       No Known Allergies    Review of Systems:   Constitutional: negative for weight changes or fevers  Genitourinary: negative for bowel/bladder incontinence   Musculoskeletal: Positive for multiple joint pain  Neurological: numbness/tingling in legs/feet, leg weakness and cramping  Behavioral/Psych: negative for anxiety/depression   All other ROS reviewed and are negative    Objective:   /68 (Site: Left Upper Arm, Position: Sitting)   Ht 6' (1.829 m)   Wt 265 lb (120.2 kg)   BMI 35.94 kg/m²     Constitutional: Pleasant, no acute distress   Head: Normocephalic, atraumatic   Eyes: Conjunctivae normal   Neck: Supple, symmetrical   Lungs: Normal respiratory effort, non-labored breathing   Cardiovascular: Limbs warm and well perfused   Abdomen: Non-protruded   Musculoskeletal: Muscle bulk symmetric, no atrophy, no gross deformities   · Lumbar Spine: ROM restricted with extension. Lumbar paraspinals tender bilaterally. SLR neg bilaterally. MIKAL neg bilaterally. GAENSLEN neg bilaterally. Positive facet loading bilaterally. Bilateral SI joints non-tender to palpation. Neurological: Cranial nerves II-XII grossly intact. · Gait - Slightly antalgic gait. Ambulates without assist device. · Motor: No focal motor deficit  Skin: No rashes or lesions present   Psychological: Cooperative, no exaggerated pain behaviors       Assessment:    Diagnosis Orders   1. Lumbar facet joint pain     2. Other chronic pain     3. Polyarthralgia     4.  Neuropathic pain         Stephan Dan is a 63 y.o.male presenting to the pain clinic for evaluation of chronic low back, bilateral leg, and bilateral feet pain. Due to his axial spondyloarthropathy he does have advanced arthritis in his lumbar spine as evident on x-ray. He does have a large skill skeletal and neuropathic component to his pain. He does have a clinical history and physical examination consistent with lumbar facet mediated pain. He has obtained a percent low back pain relief from his lumbar radiofrequency ablation performed in November 2020. At this point, we have educated him on weight loss efforts and we are shooting for a 30 pound weight loss over the next few months. I have provided him with weight loss tips and efforts. In addition, we have decreased his Cymbalta from 60 mg to 30 mg for better myofascial pain coverage. We will follow-up in 4 months. Plan: The following treatment recommendations and plan were discussed in detail with Leonard Mcmahon. Imaging:   I have reviewed patients imaging of XR L-spine and new MRI L-spine. Analgesics:   Patient is taking Naproxen. Patient is advised to take as prescribed and not take on an empty stomach. Adjuvants: With associated myofascial pain, we will decrease his Cymbalta to 30 mg daily. Interventions:   No interventions pursued at this visit. Anticoagulation/NPO Recommendations:   No interventions pursued at this visit. Multidisciplinary Pain Management:   In the presence of complex, chronic, and multi-factorial pain, the importance of a multidisciplinary approach to pain management in the patients management regimen was emphasized and discussed in great detail. WEIGHT LOSS: I discussed with the patient about 12-hour intermittent fasting, making sure he is taking daily weights on his scale, setting a goal, and we will looking for a 30 pound weight loss at our follow-up visit. Patient wants to hold off on a referral to a dietitian.     Referrals:  None    Prescriptions Written This Visit:   Cymbalta 30 mg    Follow-up: 4 months    I spent 30 minutes with patient greater than 50% of time was spent discussing weight loss goal, educated on techniques for weight loss, and discussing an exercise program to work on weight loss efforts.       David Valencia, DO  Interventional Pain Management/PM&R   [place]

## 2021-08-25 RX ORDER — DULOXETIN HYDROCHLORIDE 30 MG/1
CAPSULE, DELAYED RELEASE ORAL
Qty: 30 CAPSULE | Refills: 3 | Status: SHIPPED | OUTPATIENT
Start: 2021-08-25 | End: 2021-12-30

## 2021-12-30 ENCOUNTER — HOSPITAL ENCOUNTER (EMERGENCY)
Age: 61
Discharge: HOME | End: 2021-12-30
Attending: EMERGENCY MEDICINE
Payer: COMMERCIAL

## 2021-12-30 ENCOUNTER — APPOINTMENT (OUTPATIENT)
Dept: CT IMAGING | Age: 61
End: 2021-12-30
Payer: COMMERCIAL

## 2021-12-30 VITALS
HEIGHT: 73 IN | OXYGEN SATURATION: 98 % | SYSTOLIC BLOOD PRESSURE: 119 MMHG | TEMPERATURE: 98.2 F | WEIGHT: 290 LBS | BODY MASS INDEX: 38.43 KG/M2 | DIASTOLIC BLOOD PRESSURE: 73 MMHG | RESPIRATION RATE: 17 BRPM | HEART RATE: 78 BPM

## 2021-12-30 DIAGNOSIS — R60.9 PERIPHERAL EDEMA: ICD-10-CM

## 2021-12-30 DIAGNOSIS — R06.09 DYSPNEA ON EXERTION: ICD-10-CM

## 2021-12-30 DIAGNOSIS — R91.1 LEFT UPPER LOBE PULMONARY NODULE: ICD-10-CM

## 2021-12-30 DIAGNOSIS — R07.89 ATYPICAL CHEST PAIN: Primary | ICD-10-CM

## 2021-12-30 LAB
ALBUMIN SERPL-MCNC: 3.9 GM/DL (ref 3.4–5)
ALP BLD-CCNC: 86 U/L (ref 46–116)
ALT SERPL-CCNC: 33 U/L (ref 14–63)
ANION GAP: 9 MEQ/L (ref 8–16)
AST SERPL-CCNC: 20 U/L (ref 15–37)
BASOPHILS # BLD: 0.6 % (ref 0–3)
BILIRUB SERPL-MCNC: 0.5 MG/DL (ref 0.2–1)
BUN BLDV-MCNC: 27 MG/DL (ref 7–18)
CHLORIDE BLD-SCNC: 101 MEQ/L (ref 98–107)
CO2: 26 MEQ/L (ref 21–32)
CREAT SERPL-MCNC: 1.3 MG/DL (ref 0.6–1.3)
EOSINOPHILS RELATIVE PERCENT: 3.2 % (ref 0–4)
GFR, ESTIMATED: 59 ML/MIN/1.73M2
GLUCOSE BLD-MCNC: 113 MG/DL (ref 74–106)
HCT VFR BLD CALC: 40.9 % (ref 42–52)
HEMOGLOBIN: 13.3 GM/DL (ref 14–18)
LYMPHOCYTES # BLD: 21.7 % (ref 15–47)
MCH RBC QN AUTO: 29.3 PG (ref 27–31)
MCHC RBC AUTO-ENTMCNC: 32.5 GM/DL (ref 33–37)
MCV RBC AUTO: 90.4 FL (ref 80–94)
MONOCYTES: 11.8 % (ref 0–12)
NT PRO BNP: 25 PG/ML (ref 0–900)
PDW BLD-RTO: 13.4 % (ref 11.5–14.5)
PLATELET # BLD: 216 THOU/MM3 (ref 130–400)
PMV BLD AUTO: 7.8 FL (ref 7.4–10.4)
POC CALCIUM: 8.8 MG/DL (ref 8.5–10.1)
POTASSIUM SERPL-SCNC: 4.3 MEQ/L (ref 3.5–5.1)
RBC # BLD: 4.53 MILL/MM3 (ref 4.7–6.1)
SEGS: 62.7 % (ref 43–75)
SODIUM BLD-SCNC: 136 MEQ/L (ref 136–145)
TOTAL PROTEIN: 7.8 GM/DL (ref 6.4–8.2)
TROPONIN I: < 0.017 NG/ML (ref 0.02–0.06)
WBC # BLD: 4.6 THOU/MM3 (ref 4.8–10.8)

## 2021-12-30 PROCEDURE — 71275 CT ANGIOGRAPHY CHEST: CPT

## 2021-12-30 PROCEDURE — 84484 ASSAY OF TROPONIN QUANT: CPT

## 2021-12-30 PROCEDURE — 83880 ASSAY OF NATRIURETIC PEPTIDE: CPT

## 2021-12-30 PROCEDURE — 85025 COMPLETE CBC W/AUTO DIFF WBC: CPT

## 2021-12-30 PROCEDURE — 93005 ELECTROCARDIOGRAM TRACING: CPT | Performed by: EMERGENCY MEDICINE

## 2021-12-30 PROCEDURE — 6360000004 HC RX CONTRAST MEDICATION: Performed by: EMERGENCY MEDICINE

## 2021-12-30 PROCEDURE — 80053 COMPREHEN METABOLIC PANEL: CPT

## 2021-12-30 PROCEDURE — 6370000000 HC RX 637 (ALT 250 FOR IP): Performed by: EMERGENCY MEDICINE

## 2021-12-30 PROCEDURE — 99285 EMERGENCY DEPT VISIT HI MDM: CPT

## 2021-12-30 RX ORDER — ASPIRIN 81 MG/1
324 TABLET, CHEWABLE ORAL ONCE
Status: COMPLETED | OUTPATIENT
Start: 2021-12-30 | End: 2021-12-30

## 2021-12-30 RX ADMIN — ASPIRIN 81 MG CHEWABLE TABLET 324 MG: 81 TABLET CHEWABLE at 12:32

## 2021-12-30 RX ADMIN — IOPAMIDOL 100 ML: 755 INJECTION, SOLUTION INTRAVENOUS at 12:58

## 2021-12-30 ASSESSMENT — PAIN DESCRIPTION - PROGRESSION
CLINICAL_PROGRESSION: RESOLVED
CLINICAL_PROGRESSION: NOT CHANGED

## 2021-12-30 ASSESSMENT — PAIN DESCRIPTION - ONSET: ONSET: ON-GOING

## 2021-12-30 ASSESSMENT — PAIN DESCRIPTION - ORIENTATION
ORIENTATION: MID
ORIENTATION: MID

## 2021-12-30 ASSESSMENT — PAIN DESCRIPTION - DESCRIPTORS: DESCRIPTORS: TIGHTNESS

## 2021-12-30 ASSESSMENT — PAIN SCALES - GENERAL
PAINLEVEL_OUTOF10: 3
PAINLEVEL_OUTOF10: 0

## 2021-12-30 ASSESSMENT — PAIN DESCRIPTION - FREQUENCY: FREQUENCY: INTERMITTENT

## 2021-12-30 ASSESSMENT — PAIN DESCRIPTION - LOCATION
LOCATION: CHEST
LOCATION: CHEST

## 2021-12-30 ASSESSMENT — PAIN DESCRIPTION - PAIN TYPE
TYPE: ACUTE PAIN
TYPE: ACUTE PAIN

## 2021-12-30 NOTE — Clinical Note
Levon Stanford was seen and treated in our emergency department on 12/30/2021. He may return to work on 01/03/2021. If you have any questions or concerns, please don't hesitate to call.       Baylee Diaz MD

## 2021-12-30 NOTE — ED TRIAGE NOTES
Arrives to [facility] from his PCP office for further work up of chest tightness, SOB and bilateral leg swelling. Patient states his symptoms have been ongoing for approx 2 weeks. Has a Hx of GERD and takes Protonix daily. Also takes a Baby ASA daily, took this morning around 0700. Was seen today by Dr Hassel Severe who did an EKG in the office. Noticed changes to the EKG so sent patient to the ER. EKG completed upon arrival and reviewed by Dr Jeanne Romero. Placed on telemetry. VSS. Call light in reach.

## 2021-12-30 NOTE — ED NOTES
Out of room to bathroom and then to CT scan. Gait steady while walking in hallway. No SOB.        Chrys Cowden, RN  12/30/21 [id number]

## 2021-12-31 LAB
EKG ATRIAL RATE: 78 BPM
EKG P AXIS: 28 DEGREES
EKG P-R INTERVAL: 196 MS
EKG Q-T INTERVAL: 368 MS
EKG QRS DURATION: 98 MS
EKG QTC CALCULATION (BAZETT): 419 MS
EKG R AXIS: -15 DEGREES
EKG T AXIS: 34 DEGREES
EKG VENTRICULAR RATE: 78 BPM

## 2021-12-31 ASSESSMENT — HEART SCORE: ECG: 1

## 2021-12-31 NOTE — ED PROVIDER NOTES
[facility]  eMERGENCY dEPARTMENT eNCOUnter             Amberly Mercado 19 COMPLAINT    Chief Complaint   Patient presents with    Chest Pain     Mid     Shortness of Breath    Leg Swelling     Bilateral        Nurses Notes reviewed and I agree except as noted in the HPI. HPI    [place] Edge is a 57 y.o. male who presents from a local [de-identified] office with intermittent chest tightness, shortness of breath, swelling in his legs and ankles for about 2 weeks. He has been taking his medications as prescribed. He saw his doctor today in follow-up, and had an EKG done which showed incomplete right bundle branch block and some nonspecific T wave changes. He was sent here for further evaluation. He denies any current chest pain. At rest, he has no dyspnea. He has had 1 baby aspirin earlier today. Current pain level is 0.    REVIEW OF SYSTEMS      Review of Systems   Constitutional: Positive for malaise/fatigue. Negative for diaphoresis and fever. HENT: Negative for congestion and sore throat. Respiratory: Positive for shortness of breath (With exertion). Negative for cough and wheezing. Cardiovascular: Positive for leg swelling. Negative for palpitations. Gastrointestinal: Negative for abdominal pain and nausea. Musculoskeletal:        No calf tenderness   Neurological: Positive for weakness. Negative for dizziness, focal weakness and headaches. All other systems reviewed and are negative. PAST MEDICAL HISTORY     has a past medical history of GERD (gastroesophageal reflux disease), Hypertension, Neuropathy, and Osteoarthritis. SURGICAL HISTORY     has a past surgical history that includes knee surgery; Foot surgery (2009); lipoma resection (2010); Rotator cuff repair (Left, 08/2019); Colonoscopy (12/2019); Pain management procedure (Bilateral, 9/22/2020);  Pain management procedure (Bilateral, 10/13/2020); and Pain management procedure (Bilateral, 11/3/2020). CURRENT MEDICATIONS    Discharge Medication List as of 2021  1:40 PM      CONTINUE these medications which have NOT CHANGED    Details   NONFORMULARY Take 1 tablet by mouth daily Nervive Nerve HealthHistorical Med      Misc Natural Products (PROSTATE HEALTH) CAPS 2 TIMES DAILY, Historical MedSuper Beta Prostate Advanced       cetirizine (ZYRTEC ALLERGY) 10 MG tablet Take 10 mg by mouth dailyHistorical Med      spironolactone (ALDACTONE) 25 MG tablet Take 25 mg by mouth dailyHistorical Med      pantoprazole (PROTONIX) 40 MG tablet Take 40 mg by mouth daily. lisinopril-hydrochlorothiazide (PRINZIDE;ZESTORETIC) 20-12.5 MG per tablet Take 1 tablet by mouth 2 times daily. metoprolol (TOPROL-XL) 50 MG XL tablet Take 50 mg by mouth daily. aspirin 81 MG EC tablet Take 81 mg by mouth daily. !! certolizumab pegol (CIMZIA PREFILLED) 2 X 200 MG/ML KIT injection Inject 400 mg subcu on weeks 0, 2, and 4 then every 4 weeks thereafter., Disp-3 kit, [id number]      !! certolizumab pegol (CIMZIA PREFILLED) 2 X 200 MG/ML KIT injection Inject 400 mg into the skin every 28 days Start after loading dose, Disp-400 mg, R-5Normal      secukinumab, 300 MG Dose, (COSENTYX SENSOREADY, 300 MG,) 150 MG/ML SOAJ Inject 150mg subcu weekly for 5 weeks then 4 weeks start start injecting 150mg subcu every 4 weeks. , Disp-5 mL, R-1Normal       !! - Potential duplicate medications found. Please discuss with provider. ALLERGIES    has No Known Allergies. FAMILY HISTORY    He indicated that his mother is . He indicated that his father is . He indicated that his sister is alive. He indicated that his brother is . family history includes Cancer in his mother; Diabetes in his brother; High Blood Pressure in his brother and father. SOCIAL HISTORY     reports that he has never smoked.  He has never used smokeless tobacco. He reports current alcohol use of about technology. **      Final report electronically signed by Dr. Tracie Oh on 12/30/2021 1:12 PM           LABS:     Labs Reviewed   CBC WITH AUTO DIFFERENTIAL - Abnormal; Notable for the following components:       Result Value    WBC 4.6 (*)     RBC 4.53 (*)     Hemoglobin 13.3 (*)     Hematocrit 40.9 (*)     MCHC 32.5 (*)     All other components within normal limits   COMPREHENSIVE METABOLIC PANEL - Abnormal; Notable for the following components:    Glucose 113 (*)     BUN 27 (*)     All other components within normal limits   GLOMERULAR FILTRATION RATE, ESTIMATED - Abnormal; Notable for the following components:    GFR, Estimated 59 (*)     All other components within normal limits   TROPONIN   BRAIN NATRIURETIC PEPTIDE   ANION GAP       Vitals:    Vitals:    12/30/21 1146 12/30/21 1155 12/30/21 1232 12/30/21 1329   BP: 136/82  123/70 119/73   Pulse: 73  78 78   Resp: 16  13 17   Temp:  98.2 °F (36.8 °C)     TempSrc:  Oral     SpO2: 97%  97% 98%   Weight: 290 lb (131.5 kg)      Height: 6' 1\" (1.854 m)          EMERGENCY DEPARTMENT COURSE:    Received oral aspirin initially. Test results and plan of care discussed with the patient. No current sign of MI, CHF, or pulmonary embolism. He does have a nodule in the left lung, and follow-up was discussed. His doctor was planning to set him up for a treadmill test, which sounds like a good next step. Warning signs and symptoms discussed. He will follow up with his own physician. Heart score is 3. FINAL IMPRESSION      1. Atypical chest pain    2. Peripheral edema    3. Dyspnea on exertion    4.  Left upper lobe pulmonary nodule        DISPOSITION/PLAN    DISPOSITION Decision To Discharge 12/30/2021 01:38:43 PM      PATIENT REFERRED TO:    Grisel Conroy  [address] [address]  [phone]    Schedule an appointment as soon as possible for a visit         DISCHARGE MEDICATIONS:    Discharge Medication List as of 12/30/2021  1:40 PM (Please note that portions of this note were completed with a voice recognition program.  Efforts were made to edit the dictations but occasionally words are mis-transcribed.)      Hudson Dewey MD  12/31/21 0704

## 2022-01-04 DIAGNOSIS — U07.1 COVID-19: ICD-10-CM

## 2022-01-04 RX ORDER — ACETAMINOPHEN 325 MG/1
650 TABLET ORAL
OUTPATIENT
Start: 2022-01-04

## 2022-01-04 RX ORDER — ALBUTEROL SULFATE 90 UG/1
4 AEROSOL, METERED RESPIRATORY (INHALATION) PRN
OUTPATIENT
Start: 2022-01-04

## 2022-01-04 RX ORDER — DIPHENHYDRAMINE HYDROCHLORIDE 50 MG/ML
50 INJECTION INTRAMUSCULAR; INTRAVENOUS
OUTPATIENT
Start: 2022-01-04

## 2022-01-04 RX ORDER — SODIUM CHLORIDE 9 MG/ML
INJECTION, SOLUTION INTRAVENOUS CONTINUOUS
OUTPATIENT
Start: 2022-01-04

## 2022-01-04 RX ORDER — EPINEPHRINE 1 MG/ML
0.3 INJECTION, SOLUTION, CONCENTRATE INTRAVENOUS PRN
OUTPATIENT
Start: 2022-01-04

## 2022-01-04 RX ORDER — ONDANSETRON 2 MG/ML
8 INJECTION INTRAMUSCULAR; INTRAVENOUS
OUTPATIENT
Start: 2022-01-04

## 2022-01-04 RX ORDER — SODIUM CHLORIDE 0.9 % (FLUSH) 0.9 %
5-40 SYRINGE (ML) INJECTION PRN
OUTPATIENT
Start: 2022-01-04

## 2022-01-04 RX ORDER — HEPARIN SODIUM (PORCINE) LOCK FLUSH IV SOLN 100 UNIT/ML 100 UNIT/ML
500 SOLUTION INTRAVENOUS PRN
OUTPATIENT
Start: 2022-01-04

## 2022-01-04 RX ORDER — SODIUM CHLORIDE 9 MG/ML
25 INJECTION, SOLUTION INTRAVENOUS PRN
OUTPATIENT
Start: 2022-01-04

## 2022-01-04 NOTE — PROGRESS NOTES
REGEN-COV ordered by a non-privileged provider  - Dr. Anna Echevarria     1. Vaccine status - Vaccinated   2. Date of Positive SARS-CoV-2 test - 1/2/22  3. Symptom onset - 1/2/22  4. Criteria met for REGEN-COV - Yes  5. O2 sat on room air - >95  6. EUA and side effects have been reviewed either verbally or given to patient Yes  7.  Provider phone # if questions arise -

## 2022-01-05 ENCOUNTER — HOSPITAL ENCOUNTER (OUTPATIENT)
Dept: GENERAL RADIOLOGY | Age: 62
Discharge: HOME | End: 2022-01-05
Payer: COMMERCIAL

## 2022-01-05 VITALS
TEMPERATURE: 97.3 F | DIASTOLIC BLOOD PRESSURE: 80 MMHG | RESPIRATION RATE: 16 BRPM | HEART RATE: 90 BPM | SYSTOLIC BLOOD PRESSURE: 142 MMHG | OXYGEN SATURATION: 96 %

## 2022-01-05 DIAGNOSIS — U07.1 COVID-19: Primary | ICD-10-CM

## 2022-01-05 PROCEDURE — 6360000002 HC RX W HCPCS: Performed by: INTERNAL MEDICINE

## 2022-01-05 PROCEDURE — 2580000003 HC RX 258: Performed by: INTERNAL MEDICINE

## 2022-01-05 RX ORDER — ONDANSETRON 2 MG/ML
8 INJECTION INTRAMUSCULAR; INTRAVENOUS
OUTPATIENT
Start: 2022-01-05

## 2022-01-05 RX ORDER — SODIUM CHLORIDE 0.9 % (FLUSH) 0.9 %
5-40 SYRINGE (ML) INJECTION PRN
OUTPATIENT
Start: 2022-01-05

## 2022-01-05 RX ORDER — ALBUTEROL SULFATE 90 UG/1
4 AEROSOL, METERED RESPIRATORY (INHALATION) PRN
OUTPATIENT
Start: 2022-01-05

## 2022-01-05 RX ORDER — SODIUM CHLORIDE 9 MG/ML
INJECTION, SOLUTION INTRAVENOUS CONTINUOUS
OUTPATIENT
Start: 2022-01-05

## 2022-01-05 RX ORDER — SODIUM CHLORIDE 9 MG/ML
25 INJECTION, SOLUTION INTRAVENOUS PRN
OUTPATIENT
Start: 2022-01-05

## 2022-01-05 RX ORDER — HEPARIN SODIUM (PORCINE) LOCK FLUSH IV SOLN 100 UNIT/ML 100 UNIT/ML
500 SOLUTION INTRAVENOUS PRN
OUTPATIENT
Start: 2022-01-05

## 2022-01-05 RX ORDER — ACETAMINOPHEN 325 MG/1
650 TABLET ORAL
OUTPATIENT
Start: 2022-01-05

## 2022-01-05 RX ORDER — DIPHENHYDRAMINE HYDROCHLORIDE 50 MG/ML
50 INJECTION INTRAMUSCULAR; INTRAVENOUS
OUTPATIENT
Start: 2022-01-05

## 2022-01-05 RX ADMIN — CASIRIVIMAB AND IMDEVIMAB: 600; 600 INJECTION, SOLUTION, CONCENTRATE INTRAVENOUS at 08:15

## 2022-01-05 NOTE — PROGRESS NOTES
Met: yes   Safety:         (Environmental)   Eleanor to environment  [facility] ID band is correct and in place/ allergy band as needed   Assess for fall risk   Initiate fall precautions as applicable (fall band, side rails, etc.)   Call light within reach   Bed in low position/ wheels locked    Met: yes   Pain:        Assess pain level and characteristics   Administer analgesics as ordered   Assess effectiveness of pain management and report to MD as needed    Met: yes   Knowledge Deficit:   Assess baseline knowledge   Provide teaching at level of understanding   Provide teaching via preferred learning method   Evaluate teaching effectiveness    Met: yes   Hemodynamic/Respiratory Status:       (Pre and Post Procedure Monitoring)   Assess/Monitor vital signs and LOC   Assess Baseline SpO2 prior to any sedation   Obtain weight/height   Assess vital signs/ LOC until patient meets discharge criteria   Monitor procedure site and notify MD of any issues    Met: yes   Infection-Risk of Central Venous Catheter:   Monitor for infection signs and symptoms (catheter site redness, temperature elevation, etc)   Assess for infection risks   Educate regarding infection prevention   Manage central venous catheter (flushes/ dressing changes per protocol)

## 2022-01-05 NOTE — PROGRESS NOTES
Pt arrives for monoclonal antibody infusion ambulatory. Gait steady. Respirations easy and unlabored. Skin warm and dry. Pt reviewed medication EUA and is agreeable with infusion.

## 2022-02-28 DIAGNOSIS — M46.90 AXIAL SPONDYLOARTHRITIS (HCC): ICD-10-CM

## 2022-02-28 RX ORDER — SECUKINUMAB 150 MG/ML
INJECTION SUBCUTANEOUS
Qty: 5 ML | Refills: 1 | Status: SHIPPED | OUTPATIENT
Start: 2022-02-28 | End: 2022-04-01 | Stop reason: ALTCHOICE

## 2022-03-03 ENCOUNTER — PATIENT MESSAGE (OUTPATIENT)
Dept: PHYSICAL MEDICINE AND REHAB | Age: 62
End: 2022-03-03

## 2022-03-03 RX ORDER — DULOXETIN HYDROCHLORIDE 30 MG/1
30 CAPSULE, DELAYED RELEASE ORAL DAILY
Qty: 30 CAPSULE | Refills: 0 | Status: SHIPPED | OUTPATIENT
Start: 2022-03-03 | End: 2022-04-01 | Stop reason: SDUPTHER

## 2022-03-03 NOTE — TELEPHONE ENCOUNTER
From: Leydi Cunningham  To: Dr. Mahan Drilling: 3/3/2022 12:32 PM EST  Subject: Refill    Can I get refills on my Duloxetine hcl  30 mg

## 2022-03-03 NOTE — TELEPHONE ENCOUNTER
I can provide refills on his Cymbalta but patient will need to have a follow-up set in the near future with me.       Genaro Burnett, DO  Interventional Pain Management/PM&R   [place]

## 2022-03-17 RX ORDER — SECUKINUMAB 150 MG/ML
INJECTION SUBCUTANEOUS
Qty: 5 ML | Refills: 1 | OUTPATIENT
Start: 2022-03-17

## 2022-04-01 ENCOUNTER — OFFICE VISIT (OUTPATIENT)
Dept: PHYSICAL MEDICINE AND REHAB | Age: 62
End: 2022-04-01
Payer: COMMERCIAL

## 2022-04-01 VITALS
BODY MASS INDEX: 38.3 KG/M2 | HEIGHT: 73 IN | DIASTOLIC BLOOD PRESSURE: 68 MMHG | WEIGHT: 289 LBS | SYSTOLIC BLOOD PRESSURE: 120 MMHG

## 2022-04-01 DIAGNOSIS — M25.50 POLYARTHRALGIA: ICD-10-CM

## 2022-04-01 DIAGNOSIS — G89.29 OTHER CHRONIC PAIN: ICD-10-CM

## 2022-04-01 DIAGNOSIS — M54.59 LUMBAR FACET JOINT PAIN: Primary | ICD-10-CM

## 2022-04-01 DIAGNOSIS — M79.2 NEUROPATHIC PAIN: ICD-10-CM

## 2022-04-01 PROCEDURE — 99214 OFFICE O/P EST MOD 30 MIN: CPT | Performed by: ANESTHESIOLOGY

## 2022-04-01 RX ORDER — DULOXETIN HYDROCHLORIDE 30 MG/1
30 CAPSULE, DELAYED RELEASE ORAL DAILY
Qty: 90 CAPSULE | Refills: 1 | Status: SHIPPED | OUTPATIENT
Start: 2022-04-03

## 2022-04-01 NOTE — PROGRESS NOTES
Chronic Pain/PM&R Clinic Note     Encounter Date: 4/1/22    Subjective:   Chief Complaint:   Chief Complaint   Patient presents with    Follow-up       History of Present Illness:   Felice Hurt is a 57 y.o. male seen in the clinic initially on 09/04/20 upon request from Jody Dye DO (Rheumatology) for his history of chronic low back pain. He has history of axial spondyloarthropathy. He has multiple pain complaints. He states that his feet are most bothersome to him and that this is been going on for greater than 15 years. In addition, he states that his low back and bilateral leg pain has progressively getting worse over 5 years. He does not have any recent trauma/fall/inciting event however he did sustain a fall from height greater than 15 feet over 20 years ago. He describes low back pain as midline with radiation towards both flanks. The pain does radiate down both legs on the posterior aspect of the thighs and into the posterior aspect of the calves and plantar aspects of the feet. He has associated numbness/tingling in both legs and feet diffusely throughout. He states that he feels like his legs have lost some strength and has difficulty with balance. In addition he states it is becoming increasingly difficult to walk further than a mile due to the pain in his feet and low back. His pain is exacerbated practically when he sits down to rest and at nighttime. He does have difficulty with sleep. He states that there is not much that does improve his pain, and he feels like the medications he is currently taking are not providing much relief. He denies any saddle anesthesia or bowel/bladder incontinence. Of note, he has an MRI L-spine performed in 2013 with multilevel degenerative changes with associated multilevel spinal stenosis, neuroforaminal stenosis and facet arthropathy. He states that he had previous lumbar epidural injections well over 5 years ago with no response.   He states that he has not been enrolled in physical therapy recently but he does endorse doing daily stretching prior to work. When discussing his leg numbness/tingling, he denies any history of diabetes, vitamin deficiencies particularly B12, excessive alcohol usage in his lifetime. He denies any family history that is significant for any neuropathic pain or neuropathy. Today, 4/1/2022, patient presents for planned follow-up for chronic low back pain. He continues to have relief in his low back from his lumbar radiofrequency ablation was performed back in November 2020. He states that he had both of his knees replaced within the past year and states he is doing really well from these. Overall, he is doing extraordinarily well. He continues to take his low-dose Cymbalta without any side effects. He states he would like to have this medication refilled. Denies any other symptoms at this point. History of Intervention:   Surgery: No previous lumbar surgeries; B/L TKA (2021)  Injections: Previous lumbar epidurals? - did not get much relief  Diagnostic lumbar MBBs (9/22/20)-90% relief  Confirmatory lumbar MBBs (10/13/2020)-95% relief  Lumbar RFA (11/3/2020)-100% relief X 16 months    Current Treatment Medications:   Cymbalta 30 mg daily    Historical Treatment Medications:   Gabapentin 300 mg BID  Elavil - stopped due to interaction with Cymbalta  Naproxen - stopped by patient     Imaging:  MRI L-spine (9/22/20)    FINDINGS:    There is minimal, grade 1, retrolisthesis of L2 relative to L3 and L3 relative to L4 on the basis of degenerative change. This is stable compared to prior exams. There is otherwise anatomic vertebral body height and alignment. There are small Schmorl's    nodes at the superior and inferior endplates of L1, stable compared to prior MRI. There are few scattered areas of hyperintense T1 and T2 signal in the lumbar spinal column as evidence for hemangiomas, similar to prior MRI.  Marrow signal is otherwise    within normal limits. The conus terminates in a normal fashion at the L1-2 level. There are parapelvic cysts in the bilateral kidneys. Paraspinal soft tissues are otherwise unremarkable. At T12-L1 there is a central protrusion without significant spinal canal or neuroforaminal stenosis. At L1-2 there is no significant spinal canal or neuroforaminal stenosis. At L2-3 there is minimal partial uncovering the disc with superimposed shallow disc bulge which mildly indents thecal sac but does not contact traversing nerve roots. There is moderate right and mild left neural foraminal stenosis in association with    facet hypertrophy and ligamentum flavum thickening, similar to prior exam.    At L3-4 there is minimal partial uncovering the disc with superimposed shallow disc bulge which mildly indents thecal sac and may contact traversing L4 nerve roots bilaterally and contributes to mild spinal canal stenosis in association with facet    hypertrophy and ligamentum flavum thickening, similar to prior exam. There is mild to moderate left and moderate right neuroforaminal stenosis. At L4-5 there is a shallow disc bulge which mildly indents thecal sac but does not contact traversing nerve roots and contributes to mild spinal canal stenosis in association with facet hypertrophy and ligament flavum thickening which has progressed in    the interval. There is mild to moderate bilateral neural foraminal stenosis. At L5-S1 there is a shallow disc bulge which mildly indents thecal sac but does not contact traversing nerve roots. There is moderate bilateral neural foraminal stenosis in association with facet hypertrophy and ligament flavum thickening, similar to    prior exam.              Impression     Multilevel degenerative changes largely stable compared to prior MRI in 2013 with areas of up to mild spinal canal stenosis and up to moderate neural foraminal stenosis.  Please refer to the body of the report for segmental analysis.             XR L-spine (8/27/20)  FINDINGS: No acute fracture or subluxation. Degenerative disc disease with disc space narrowing at virtually all levels. The disc space narrowing has progressed at L4-5 and L5 1 since the prior exam. There are bridging osteophytes at L3-4, L4-5, and to a     lesser degree at L2-3. Pedicles are intact. Probable discal calcification at T10-11. Bridging osteophyte at T12-L1              Impression    Diffuse degenerative changes throughout the lumbar spine.  Disc space narrowing has progressed at L4-5 and L5-S1 since the prior exam        Past Medical History:   Diagnosis Date    GERD (gastroesophageal reflux disease)     Hypertension     Neuropathy     Osteoarthritis        Past Surgical History:   Procedure Laterality Date    COLONOSCOPY  12/2019    FOOT SURGERY  2009    JOINT REPLACEMENT Bilateral 8/2021, 9/2021    KNEE SURGERY      1997, 2008 (right) 2007 (Left)   [address] RESECTION  2010    Chest    PAIN MANAGEMENT PROCEDURE Bilateral 9/22/2020    medial branch blocks at bilateral L4, L5, and S1 performed by Obdulia Duty, DO at [facility] 113 Bilateral 10/13/2020    medial branch blocks at bilateral L4, L5, and S1 [facility] Bilateral 11/3/2020    radiofrequency ablation at bilateral L4, L5, and S1 performed by Delta Duty, DO at [facility] 68 Left 08/2019       Family History   Problem Relation Age of Onset    Cancer Mother         brain    High Blood Pressure Father     High Blood Pressure Brother     Diabetes Brother        Social History     Socioeconomic History    Marital status:      Spouse name: Not on file    Number of children: Not on file    Years of education: Not on file    Highest education level: Not on file   Occupational History    Not on file Tobacco Use    Smoking status: Never Smoker    Smokeless tobacco: Never Used   Vaping Use    Vaping Use: Never used   Substance and Sexual Activity    Alcohol use: Yes     Alcohol/week: 12.0 standard drinks     Types: 12 Cans of beer per week    Drug use: Never    Sexual activity: Not on file   Other Topics Concern    Not on file   Social History Narrative    Not on file     Social Determinants of Health     Financial Resource Strain:     Difficulty of Paying Living Expenses: Not on file   Food Insecurity:     Worried About Running Out of Food in the Last Year: Not on file    Carmen of Food in the Last Year: Not on file   Transportation Needs:     Lack of Transportation (Medical): Not on file    Lack of Transportation (Non-Medical):  Not on file   Physical Activity:     Days of Exercise per Week: Not on file    Minutes of Exercise per Session: Not on file   Stress:     Feeling of Stress : Not on file   Social Connections:     Frequency of Communication with Friends and Family: Not on file    Frequency of Social Gatherings with Friends and Family: Not on file    Attends Jain Services: Not on file    Active Member of [address] [place] [address] or Organizations: Not on file    Attends Club or Organization Meetings: Not on file    Marital Status: Not on file   Intimate Partner Violence:     Fear of Current or Ex-Partner: Not on file    Emotionally Abused: Not on file    Physically Abused: Not on file    Sexually Abused: Not on file   Housing Stability:     Unable to Pay for Housing in the Last Year: Not on file    Number of [place] in the Last Year: Not on file    Unstable Housing in the Last Year: Not on file       Medications & Allergies:   Current Outpatient Medications   Medication Instructions    aspirin 81 mg, DAILY    cetirizine (ZYRTEC ALLERGY) 10 mg, Oral, DAILY    DULoxetine (CYMBALTA) 30 mg, Oral, DAILY    lisinopril-hydrochlorothiazide (PRINZIDE;ZESTORETIC) 20-12.5 MG per tablet 1 tablet, 2 TIMES DAILY    metoprolol succinate (TOPROL XL) 50 mg, DAILY    Misc Natural Products (PROSTATE HEALTH) CAPS 1 capsule, Oral, 2 TIMES DAILY, Super Beta Prostate Advanced     NONFORMULARY 1 tablet, Oral, DAILY, "[employer] Nerve Health     pantoprazole (PROTONIX) 40 mg, DAILY    spironolactone (ALDACTONE) 25 mg, Oral, DAILY       No Known Allergies    Review of Systems:   Constitutional: negative for weight changes or fevers  Genitourinary: negative for bowel/bladder incontinence   Musculoskeletal: Positive for multiple joint pain  Neurological: numbness/tingling in legs/feet, leg weakness and cramping  Behavioral/Psych: negative for anxiety/depression   All other ROS reviewed and are negative    Objective:   /68 (Site: Left Upper Arm, Position: Sitting)   Ht 6' 1\" (1.854 m)   Wt 289 lb (131.1 kg)   BMI 38.13 kg/m²     Constitutional: Pleasant, no acute distress   Head: Normocephalic, atraumatic   Eyes: Conjunctivae normal   Neck: Supple, symmetrical   Lungs: Normal respiratory effort, non-labored breathing   Cardiovascular: Limbs warm and well perfused   Abdomen: Non-protruded   Musculoskeletal: Muscle bulk symmetric, no atrophy, no gross deformities   · Lumbar Spine: ROM restricted with extension. Lumbar paraspinals tender bilaterally. SLR neg bilaterally. MIKAL neg bilaterally. GAENSLEN neg bilaterally. Positive facet loading bilaterally. Bilateral SI joints non-tender to palpation. Neurological: Cranial nerves II-XII grossly intact. · Gait - Slightly antalgic gait. Ambulates without assist device. · Motor: No focal motor deficit  Skin: No rashes or lesions present   Psychological: Cooperative, no exaggerated pain behaviors       Assessment:    Diagnosis Orders   1. Lumbar facet joint pain     2. Other chronic pain     3. Polyarthralgia     4.  Neuropathic pain         Chelly Robertson is a 57 y.o.male presenting to the pain clinic for evaluation of chronic low back, bilateral leg, and bilateral feet pain. Due to his axial spondyloarthropathy he does have advanced arthritis in his lumbar spine as evident on x-ray. His physical examination is consistent with lumbar facet mediated pain. He has obtained significant relief in his low back after his lumbar radiofrequency ablation back in November 2020. I have refilled his low-dose Cymbalta. We will follow-up in 6 months for reevaluation. Plan: The following treatment recommendations and plan were discussed in detail with Brandee Stock. Imaging:   I have reviewed patients imaging of XR L-spine and new MRI L-spine. Analgesics:   Patient is taking Naproxen. Patient is advised to take as prescribed and not take on an empty stomach. Adjuvants: With associated myofascial pain, I have continued the Cymbalta at 30 mg daily. Interventions:   No interventions pursued at this visit. Anticoagulation/NPO Recommendations:   No interventions pursued at this visit. Multidisciplinary Pain Management:   In the presence of complex, chronic, and multi-factorial pain, the importance of a multidisciplinary approach to pain management in the patients management regimen was emphasized and discussed in great detail. WEIGHT LOSS: I discussed with the patient about 12-hour intermittent fasting, making sure he is taking daily weights on his scale, setting a goal, and we will looking for a 30 pound weight loss at our follow-up visit. Patient wants to hold off on a referral to a dietitian.     Referrals:  None    Prescriptions Written This Visit:   Cymbalta 30 mg    Follow-up: 6 months    Jeromy Finley DO  Interventional Pain Management/PM&R   [place]

## 2022-10-10 RX ORDER — DULOXETIN HYDROCHLORIDE 30 MG/1
30 CAPSULE, DELAYED RELEASE ORAL DAILY
Qty: 90 CAPSULE | Refills: 1 | OUTPATIENT
Start: 2022-10-10

## 2022-10-14 RX ORDER — DULOXETIN HYDROCHLORIDE 30 MG/1
30 CAPSULE, DELAYED RELEASE ORAL DAILY
Qty: 90 CAPSULE | Refills: 1 | OUTPATIENT
Start: 2022-10-14

## 2022-10-14 RX ORDER — DULOXETIN HYDROCHLORIDE 60 MG/1
60 CAPSULE, DELAYED RELEASE ORAL DAILY
Qty: 90 CAPSULE | Refills: 1 | Status: SHIPPED | OUTPATIENT
Start: 2022-10-14

## 2022-10-14 NOTE — TELEPHONE ENCOUNTER
OARRS reviewed. UDS: n/a  Last seen: 4/1/2022.  Follow-up:   Future Appointments   Date Time Provider Iesha Bárbara   12/30/2022  8:40 AM DO VIK Palmer [facility] - KELLY BARCLAY II.KTEL

## 2022-10-17 ENCOUNTER — TELEPHONE (OUTPATIENT)
Dept: PHYSICAL MEDICINE AND REHAB | Age: 62
End: 2022-10-17

## 2022-10-17 NOTE — TELEPHONE ENCOUNTER
[address] [place] [address] primary care called to get William's OV when he saw melvina 04-01-22, and his cymbalta dosage. Please fax to [phone].

## 2022-12-30 ENCOUNTER — OFFICE VISIT (OUTPATIENT)
Dept: PHYSICAL MEDICINE AND REHAB | Age: 62
End: 2022-12-30
Payer: COMMERCIAL

## 2022-12-30 VITALS
HEIGHT: 73 IN | WEIGHT: 289 LBS | DIASTOLIC BLOOD PRESSURE: 66 MMHG | BODY MASS INDEX: 38.3 KG/M2 | SYSTOLIC BLOOD PRESSURE: 120 MMHG

## 2022-12-30 DIAGNOSIS — M79.2 NEUROPATHIC PAIN: ICD-10-CM

## 2022-12-30 DIAGNOSIS — M25.50 POLYARTHRALGIA: ICD-10-CM

## 2022-12-30 DIAGNOSIS — G89.29 OTHER CHRONIC PAIN: ICD-10-CM

## 2022-12-30 DIAGNOSIS — M54.59 LUMBAR FACET JOINT PAIN: Primary | ICD-10-CM

## 2022-12-30 PROCEDURE — 99214 OFFICE O/P EST MOD 30 MIN: CPT | Performed by: ANESTHESIOLOGY

## 2022-12-30 NOTE — PROGRESS NOTES
Chronic Pain/PM&R Clinic Note     Encounter Date: 12/30/22    Subjective:   Chief Complaint:   Chief Complaint   Patient presents with    Follow-up         History of Present Illness:   Edenilson Bangura is a 60 y.o. male seen in the clinic initially on 09/04/20 upon request from Sasha Wilder DO (Rheumatology) for his history of chronic low back pain. He has history of axial spondyloarthropathy. He has multiple pain complaints. He states that his feet are most bothersome to him and that this is been going on for greater than 15 years. In addition, he states that his low back and bilateral leg pain has progressively getting worse over 5 years. He does not have any recent trauma/fall/inciting event however he did sustain a fall from height greater than 15 feet over 20 years ago. He describes low back pain as midline with radiation towards both flanks. The pain does radiate down both legs on the posterior aspect of the thighs and into the posterior aspect of the calves and plantar aspects of the feet. He has associated numbness/tingling in both legs and feet diffusely throughout. He states that he feels like his legs have lost some strength and has difficulty with balance. In addition he states it is becoming increasingly difficult to walk further than a mile due to the pain in his feet and low back. His pain is exacerbated practically when he sits down to rest and at nighttime. He does have difficulty with sleep. He states that there is not much that does improve his pain, and he feels like the medications he is currently taking are not providing much relief. He denies any saddle anesthesia or bowel/bladder incontinence. Of note, he has an MRI L-spine performed in 2013 with multilevel degenerative changes with associated multilevel spinal stenosis, neuroforaminal stenosis and facet arthropathy. He states that he had previous lumbar epidural injections well over 5 years ago with no response.   He states that he has not been enrolled in physical therapy recently but he does endorse doing daily stretching prior to work. When discussing his leg numbness/tingling, he denies any history of diabetes, vitamin deficiencies particularly B12, excessive alcohol usage in his lifetime. He denies any family history that is significant for any neuropathic pain or neuropathy. Today, 12/30/2022, patient presents for planned follow-up for chronic low back pain. He continues to obtain relief from his lumbar radiofrequency ablation performed back in November 2020. He states that he is very happy overall with the relief he has obtained from this. He continues to take his Cymbalta without any side effects. He would like to continue this medication. He states that he would like to hold off on repeating his ablation until he is at the age of 71. He denies any other new symptoms at this point. History of Intervention:   Surgery: No previous lumbar surgeries; B/L TKA (2021)  Injections: Previous lumbar epidurals? - did not get much relief  Diagnostic lumbar MBBs (9/22/20)-90% relief  Confirmatory lumbar MBBs (10/13/2020)-95% relief  Lumbar RFA (11/3/2020)-100% relief X 24 months    Current Treatment Medications:   Cymbalta 30 mg daily    Historical Treatment Medications:   Gabapentin 300 mg BID  Elavil - stopped due to interaction with Cymbalta  Naproxen - stopped by patient     Imaging:  MRI L-spine (9/22/20)    FINDINGS:    There is minimal, grade 1, retrolisthesis of L2 relative to L3 and L3 relative to L4 on the basis of degenerative change. This is stable compared to prior exams. There is otherwise anatomic vertebral body height and alignment. There are small Schmorl's    nodes at the superior and inferior endplates of L1, stable compared to prior MRI. There are few scattered areas of hyperintense T1 and T2 signal in the lumbar spinal column as evidence for hemangiomas, similar to prior MRI.  Marrow signal is otherwise    within normal limits. The conus terminates in a normal fashion at the L1-2 level. There are parapelvic cysts in the bilateral kidneys. Paraspinal soft tissues are otherwise unremarkable. At T12-L1 there is a central protrusion without significant spinal canal or neuroforaminal stenosis. At L1-2 there is no significant spinal canal or neuroforaminal stenosis. At L2-3 there is minimal partial uncovering the disc with superimposed shallow disc bulge which mildly indents thecal sac but does not contact traversing nerve roots. There is moderate right and mild left neural foraminal stenosis in association with    facet hypertrophy and ligamentum flavum thickening, similar to prior exam.    At L3-4 there is minimal partial uncovering the disc with superimposed shallow disc bulge which mildly indents thecal sac and may contact traversing L4 nerve roots bilaterally and contributes to mild spinal canal stenosis in association with facet    hypertrophy and ligamentum flavum thickening, similar to prior exam. There is mild to moderate left and moderate right neuroforaminal stenosis. At L4-5 there is a shallow disc bulge which mildly indents thecal sac but does not contact traversing nerve roots and contributes to mild spinal canal stenosis in association with facet hypertrophy and ligament flavum thickening which has progressed in    the interval. There is mild to moderate bilateral neural foraminal stenosis. At L5-S1 there is a shallow disc bulge which mildly indents thecal sac but does not contact traversing nerve roots. There is moderate bilateral neural foraminal stenosis in association with facet hypertrophy and ligament flavum thickening, similar to    prior exam.              Impression     Multilevel degenerative changes largely stable compared to prior MRI in 2013 with areas of up to mild spinal canal stenosis and up to moderate neural foraminal stenosis.  Please refer to the body of the report for segmental analysis. XR L-spine (8/27/20)  FINDINGS: No acute fracture or subluxation. Degenerative disc disease with disc space narrowing at virtually all levels. The disc space narrowing has progressed at L4-5 and L5 1 since the prior exam. There are bridging osteophytes at L3-4, L4-5, and to a     lesser degree at L2-3. Pedicles are intact. Probable discal calcification at T10-11. Bridging osteophyte at T12-L1              Impression    Diffuse degenerative changes throughout the lumbar spine.  Disc space narrowing has progressed at L4-5 and L5-S1 since the prior exam        Past Medical History:   Diagnosis Date    GERD (gastroesophageal reflux disease)     Hypertension     Neuropathy     Osteoarthritis        Past Surgical History:   Procedure Laterality Date    COLONOSCOPY  12/2019    FOOT SURGERY  2009    JOINT REPLACEMENT Bilateral 8/2021, 9/2021    KNEE SURGERY      1997, 2008 (right) 2007 (Left)    LIPOMA RESECTION  2010    Chest    PAIN MANAGEMENT PROCEDURE Bilateral 9/22/2020    medial branch blocks at bilateral L4, L5, and S1 performed by Jade Law DO at [facility] Bilateral 10/13/2020    medial branch blocks at bilateral L4, L5, and S1 performed by Jade Law DO at [facility] Bilateral 11/3/2020    radiofrequency ablation at bilateral L4, L5, and S1 performed by Jade Law DO at [address] [place] [address] 08/2019       Family History   Problem Relation Age of Onset    Cancer Mother         brain    High Blood Pressure Father     High Blood Pressure Brother     Diabetes Brother        Social History     Socioeconomic History    Marital status:      Spouse name: Not on file    Number of children: Not on file    Years of education: Not on file    Highest education level: Not on file   Occupational History    Not on file   Tobacco Use    Smoking status: Never    Smokeless tobacco: Never   Vaping Use    Vaping Use: Never used   Substance and Sexual Activity    Alcohol use:  Yes     Alcohol/week: 12.0 standard drinks     Types: 12 Cans of beer per week    Drug use: Never    Sexual activity: Not on file   Other Topics Concern    Not on file   Social History Narrative    Not on file     Social Determinants of Health     Financial Resource Strain: Not on file   Food Insecurity: Not on file   Transportation Needs: Not on file   Physical Activity: Not on file   Stress: Not on file   Social Connections: Not on file   Intimate Partner Violence: Not on file   Housing Stability: Not on file       Medications & Allergies:   Current Outpatient Medications   Medication Instructions    aspirin 81 mg, DAILY    DULoxetine (CYMBALTA) 30 mg, Oral, DAILY    DULoxetine (CYMBALTA) 60 mg, Oral, DAILY    lisinopril-hydrochlorothiazide (PRINZIDE;ZESTORETIC) 20-12.5 MG per tablet 1 tablet, 2 TIMES DAILY    metoprolol succinate (TOPROL XL) 50 mg, DAILY    Misc Natural Products (PROSTATE HEALTH) CAPS 1 capsule, Oral, 2 TIMES DAILY, Super Beta Prostate Advanced     NONFORMULARY 1 tablet, Oral, DAILY, Nervive Nerve Health     pantoprazole (PROTONIX) 40 mg, DAILY    spironolactone (ALDACTONE) 25 mg, Oral, DAILY       No Known Allergies    Review of Systems:   Constitutional: negative for weight changes or fevers  Genitourinary: negative for bowel/bladder incontinence   Musculoskeletal: Positive for multiple joint pain  Neurological: numbness/tingling in legs/feet, leg weakness and cramping  Behavioral/Psych: negative for anxiety/depression   All other ROS reviewed and are negative    Objective:   /66 (Site: Left Upper Arm, Position: Sitting)   Ht 6' 1\" (1.854 m)   Wt 289 lb (131.1 kg)   BMI 38.13 kg/m²     Constitutional: Pleasant, no acute distress   Head: Normocephalic, atraumatic   Eyes: Conjunctivae normal   Neck: Supple, symmetrical   Lungs: Normal respiratory effort, non-labored breathing   Cardiovascular: Limbs warm and well perfused   Abdomen: Non-protruded   Musculoskeletal: Muscle bulk symmetric, no atrophy, no gross deformities   · Lumbar Spine: ROM restricted with extension. Lumbar paraspinals tender bilaterally. SLR neg bilaterally. MIKAL neg bilaterally. GAENSLEN neg bilaterally. Positive facet loading bilaterally. Bilateral SI joints non-tender to palpation. Neurological: Cranial nerves II-XII grossly intact. · Gait - Slightly antalgic gait. Ambulates without assist device. · Motor: No focal motor deficit  Skin: No rashes or lesions present   Psychological: Cooperative, no exaggerated pain behaviors       Assessment:    Diagnosis Orders   1. Lumbar facet joint pain        2. Other chronic pain        3. Polyarthralgia        4. Neuropathic pain              Regina Doe is a 60 y.o.male presenting to the pain clinic for evaluation of chronic low back, bilateral leg, and bilateral feet pain. Due to his axial spondyloarthropathy he does have advanced arthritis in his lumbar spine as evident on x-ray. His physical examination is consistent with lumbar facet mediated pain. He has obtained significant relief in his low back after his lumbar radiofrequency ablation back in November 2020. I have refilled his low-dose Cymbalta. We will follow-up in 6 months for reevaluation. Plan: The following treatment recommendations and plan were discussed in detail with Regina Doe. Imaging:   I have reviewed patients imaging of XR L-spine and new MRI L-spine. Analgesics:   Patient is taking Naproxen. Patient is advised to take as prescribed and not take on an empty stomach. Adjuvants: With associated myofascial pain, I have continued the Cymbalta at 30 mg daily. Interventions:   No interventions pursued at this visit. Anticoagulation/NPO Recommendations:   No interventions pursued at this visit.     Multidisciplinary Pain Management:   In the

## 2023-03-30 RX ORDER — DULOXETIN HYDROCHLORIDE 60 MG/1
60 CAPSULE, DELAYED RELEASE ORAL DAILY
Qty: 90 CAPSULE | Refills: 1 | Status: SHIPPED | OUTPATIENT
Start: 2023-04-06 | End: 2023-10-03

## 2023-03-30 NOTE — TELEPHONE ENCOUNTER
OARRS reviewed. UDS: negative   Last seen: 12/30/2022.  Follow-up:   Future Appointments   Date Time Provider Iesha Granger   5/26/2023  9:40 AM DO VIK Downing [facility]

## 2023-09-25 RX ORDER — DULOXETIN HYDROCHLORIDE 60 MG/1
60 CAPSULE, DELAYED RELEASE ORAL DAILY
Qty: 90 CAPSULE | Refills: 1 | Status: SHIPPED | OUTPATIENT
Start: 2023-10-03 | End: 2023-09-29 | Stop reason: SDUPTHER

## 2023-09-25 NOTE — TELEPHONE ENCOUNTER
OARRS reviewed. UDS: negative   Last seen: 12/30/2022.  Follow-up:   Future Appointments   Date Time Provider [address]  [facility]   9/27/2023  2:40 PM DO VIK Sweeney [facility]

## 2023-09-27 ENCOUNTER — OFFICE VISIT (OUTPATIENT)
Dept: PHYSICAL MEDICINE AND REHAB | Age: 63
End: 2023-09-27
Payer: COMMERCIAL

## 2023-09-27 VITALS
WEIGHT: 289 LBS | HEIGHT: 73 IN | SYSTOLIC BLOOD PRESSURE: 110 MMHG | BODY MASS INDEX: 38.3 KG/M2 | DIASTOLIC BLOOD PRESSURE: 72 MMHG

## 2023-09-27 DIAGNOSIS — G89.29 OTHER CHRONIC PAIN: ICD-10-CM

## 2023-09-27 DIAGNOSIS — M47.816 LUMBAR SPONDYLOSIS: Primary | ICD-10-CM

## 2023-09-27 DIAGNOSIS — M51.36 DEGENERATIVE DISC DISEASE, LUMBAR: ICD-10-CM

## 2023-09-27 DIAGNOSIS — M79.2 NEUROPATHIC PAIN: ICD-10-CM

## 2023-09-27 DIAGNOSIS — M25.50 POLYARTHRALGIA: ICD-10-CM

## 2023-09-27 PROCEDURE — 99214 OFFICE O/P EST MOD 30 MIN: CPT | Performed by: ANESTHESIOLOGY

## 2023-09-27 RX ORDER — PREGABALIN 75 MG/1
75 CAPSULE ORAL 2 TIMES DAILY
Qty: 60 CAPSULE | Refills: 2 | Status: SHIPPED | OUTPATIENT
Start: 2023-09-27 | End: 2023-12-26

## 2023-09-29 RX ORDER — DULOXETIN HYDROCHLORIDE 60 MG/1
60 CAPSULE, DELAYED RELEASE ORAL DAILY
Qty: 90 CAPSULE | Refills: 3 | Status: SHIPPED | OUTPATIENT
Start: 2023-10-03 | End: 2024-09-27

## 2023-09-29 NOTE — TELEPHONE ENCOUNTER
Pt .lvm that script for cymbalta sent in to wrong pharmacy. Wants [address] [facility] with 90 days and 3 refills.  Canceled old script and setting up new

## 2023-10-12 NOTE — DISCHARGE INSTRUCTIONS
Post procedure Instructions:    No driving or making significant decisions for the remainder of the day. Be cautions with walking and activity for 24 hours, do not over exert yourself. Ok to resume pre-procedure activity level today. Apply ice to site of injection site if you have pain or discomfort. Do not submerge sit of injection during bath or pool activities for 48 hours post-procedure. Resume blood thinning medications in 24 hours. Call office [phone] if you have:  Temperature greater than 100.4  Persistent nausea and vomiting  Severe uncontrolled pain  Redness, tenderness, or signs of infection (pain, swelling, redness, odor or green/yellow discharge around the site)  Difficulty breathing, headache or visual disturbances  Hives  Persistent dizziness or light-headedness  Extreme fatigue  Any other questions or concerns you may have after discharge    In an emergency, call 911 or go to an Emergency Department at a nearby hospital    How Do you Prevent Surgical Site Infections? *HAND WASHING     *STOP SHAVING   Wash your hands multiple times daily  Do not shave incisional area 48 hours before   with soap for 20 seconds. or until 2 weeks after surgery. This can   Before eating and wound care. cause tiny cuts in the skin which can lead to infection. After using the bathroom or touching pets. *BALANCE      Times of activity and rest.      Stay away from people who may be sick. *QUIT SMOKING      Cigarette smoking leads to slow wound      healing. [facility] YOUR BODY  Follow your doctor's instructions on washing the night before and the day of your surgery. You may use products like:  Dial antibacterial soap, Hibiclens, April-hex. Do not share personal items such as towels, wash cloths, or toothbrush.      *BLOOD SUGAR CONTROL                  Lower blood sugars help to prevent                          infections, scarring, and

## 2023-10-17 ENCOUNTER — HOSPITAL ENCOUNTER (OUTPATIENT)
Age: 63
Setting detail: OUTPATIENT SURGERY
Discharge: HOME | End: 2023-10-17
Attending: ANESTHESIOLOGY | Admitting: ANESTHESIOLOGY
Payer: COMMERCIAL

## 2023-10-17 ENCOUNTER — APPOINTMENT (OUTPATIENT)
Dept: GENERAL RADIOLOGY | Age: 63
End: 2023-10-17
Attending: ANESTHESIOLOGY
Payer: COMMERCIAL

## 2023-10-17 VITALS
DIASTOLIC BLOOD PRESSURE: 65 MMHG | BODY MASS INDEX: 39.82 KG/M2 | TEMPERATURE: 97.9 F | HEART RATE: 74 BPM | SYSTOLIC BLOOD PRESSURE: 127 MMHG | OXYGEN SATURATION: 93 % | RESPIRATION RATE: 16 BRPM | WEIGHT: 294 LBS | HEIGHT: 72 IN

## 2023-10-17 PROCEDURE — 64636 DESTROY L/S FACET JNT ADDL: CPT | Performed by: ANESTHESIOLOGY

## 2023-10-17 PROCEDURE — 3600000056 HC PAIN LEVEL 4 BASE: Performed by: ANESTHESIOLOGY

## 2023-10-17 PROCEDURE — 3600000057 HC PAIN LEVEL 4 ADDL 15 MIN: Performed by: ANESTHESIOLOGY

## 2023-10-17 PROCEDURE — 7100000010 HC PHASE II RECOVERY - FIRST 15 MIN: Performed by: ANESTHESIOLOGY

## 2023-10-17 PROCEDURE — 2500000003 HC RX 250 WO HCPCS: Performed by: ANESTHESIOLOGY

## 2023-10-17 PROCEDURE — 2709999900 HC NON-CHARGEABLE SUPPLY: Performed by: ANESTHESIOLOGY

## 2023-10-17 PROCEDURE — 64635 DESTROY LUMB/SAC FACET JNT: CPT | Performed by: ANESTHESIOLOGY

## 2023-10-17 RX ORDER — LIDOCAINE HYDROCHLORIDE 10 MG/ML
INJECTION, SOLUTION EPIDURAL; INFILTRATION; INTRACAUDAL; PERINEURAL PRN
Status: DISCONTINUED | OUTPATIENT
Start: 2023-10-17 | End: 2023-10-17 | Stop reason: ALTCHOICE

## 2023-10-17 RX ORDER — LIDOCAINE HYDROCHLORIDE 20 MG/ML
INJECTION, SOLUTION EPIDURAL; INFILTRATION; INTRACAUDAL; PERINEURAL PRN
Status: DISCONTINUED | OUTPATIENT
Start: 2023-10-17 | End: 2023-10-17 | Stop reason: ALTCHOICE

## 2023-10-17 ASSESSMENT — PAIN SCALES - GENERAL: PAINLEVEL_OUTOF10: 0

## 2023-10-17 ASSESSMENT — PAIN - FUNCTIONAL ASSESSMENT: PAIN_FUNCTIONAL_ASSESSMENT: 0-10

## 2023-10-17 NOTE — PROGRESS NOTES
1442 Patient arrived to phase II via cart. Spontaneous respiraitons even and unlabored. Placed on monitor--VSS. Report received from 2700 [facility] Assessment completed. Patient is alert and oriented x4. IV capped off-- no complications. Patient denies pain--will monitor. Injection sites clean and dry. Pt states readiness for discharge. 1445 Pt. Standing at bedside. With stand by assist of RN. Pt. Denies weakness or dizziness. 1446 Pt. Getting self dressed. 1447 Pt. Ambulated to private vehicle in stable condition with stand by assist of RN.

## 2023-10-17 NOTE — PROCEDURES
Pre-operative Diagnosis: Lumbar facet pain     Post-operative Diagnosis: Lumbar facet pain     Procedure: Bilateral lumbar thermal radiofrequency ablation targeting facet joints L4/L5 and L5/S1    Procedure Description:  After consent was obtained, the patient was placed in the prone position with a pillow under the abdomen to reduce the lordotic curve of the lumbar spine. The lower back was prepped with chloraprep and draped in a sterile fashion. Then, 0.5 cc of 1 % lidocaine was used for local anesthesia of the skin and superficial subcutaneous tissues. Three 20-gauge 100mm SMK cannulas with 10-mm active tips were advanced under fluoroscopic guidance in an AP view to the junction of the superior articular process and the transverse process of the L4 and L5 vertebra and at the sacral ala. There were no paresthesias, heme or CSF obtained. Needle placement was confirmed using AP and oblique views. This procedure was repeated on the contralateral side. Sensory and motor stimulation at 50Hz and 2Hz and impedance measurements were carried out having reached threshold at:     RIGHT  L4: 0.2V/3V/150-300 Ohms  L5: 0.2V/3V/150-300 Ohms  SA: 0.2V/3V/150-300 Ohms     LEFT  L4: 0.2V/3V/150-300 Ohms  L5: 0.2V/3V/150-300 Ohms  SA: 0.2V/3V/150-300 Ohms        Then, 1cc of 2% Lidocaine was injected at the site. Temperature was then raised to 80 degrees centigrade for 90 seconds with a 15 second temperature ramp. No pain was reported during the lesioning. The needles were then withdrawn without complications. The patient tolerated the procedure well. The patient was transported to the recovery room and discharged in ambulatory fashion.     Procedural Complications: None  Estimated Blood Loss: 0 mL          Marco Antonio Donald DO  Interventional Pain Management/PM&R   [facility]

## 2024-03-18 ENCOUNTER — OFFICE VISIT (OUTPATIENT)
Dept: PHYSICAL MEDICINE AND REHAB | Age: 64
End: 2024-03-18
Payer: COMMERCIAL

## 2024-03-18 VITALS
HEIGHT: 72 IN | WEIGHT: 294.09 LBS | DIASTOLIC BLOOD PRESSURE: 70 MMHG | SYSTOLIC BLOOD PRESSURE: 130 MMHG | BODY MASS INDEX: 39.83 KG/M2

## 2024-03-18 DIAGNOSIS — M79.2 NEUROPATHIC PAIN: Primary | ICD-10-CM

## 2024-03-18 DIAGNOSIS — M25.50 POLYARTHRALGIA: ICD-10-CM

## 2024-03-18 DIAGNOSIS — M51.36 DEGENERATIVE DISC DISEASE, LUMBAR: ICD-10-CM

## 2024-03-18 DIAGNOSIS — M47.816 LUMBAR SPONDYLOSIS: ICD-10-CM

## 2024-03-18 PROCEDURE — 99213 OFFICE O/P EST LOW 20 MIN: CPT | Performed by: ANESTHESIOLOGY

## 2024-03-18 NOTE — PROGRESS NOTES
[facility] PROFESSIONAL SERVS  [facility] NEUROSCIENCE AND REHABILITATION [address]  [facility] [address]  Dept: [phone]  Dept Fax: [phone]  Loc: [phone]    Visit Date: 3/18/2024    Functionality Assessment/Goals Worksheet     On a scale of 0 (Does not Interfere) to 10 (Completely Interferes)     1.  Which number describes how during the past week pain has interfered with       the following:  A.  General Activity:  7  B.  Mood: 4  C.  Walking Ability:  6  D.  Normal Work (Includes both work outside the home and housework):  6  E.  Relations with Other People:   0  F.  Sleep:   5  G.  Enjoyment of Life:   4    2.  Patient Prefers to Take their Pain Medications:     []  On a regular basis   []  Only when necessary    [x]  Does not take pain medications    3.  What are the Patient's Goals/Expectations for Visiting Pain Management?     [x]  Learn about my pain    []  Receive Medication   []  Physical Therapy     []  Treat Depression   []  Receive Injections    []  Treat Sleep   []  Deal with Anxiety and Stress   []  Treat Opoid Dependence/Addiction   []  Other:  
atraumatic   Eyes: Conjunctivae normal   Neck: Supple, symmetrical   Lungs: Normal respiratory effort, non-labored breathing   Cardiovascular: Limbs warm and well perfused   Abdomen: Non-protruded   Musculoskeletal: Muscle bulk symmetric, no atrophy, no gross deformities   · Lumbar Spine: ROM restricted with extension. Lumbar paraspinals tender bilaterally. SLR neg bilaterally. MIKAL neg bilaterally. GAENSLEN neg bilaterally. Positive facet loading bilaterally. Bilateral SI joints non-tender to palpation.  Neurological: Cranial nerves II-XII grossly intact.   · Gait - Slightly antalgic gait. Ambulates without assist device.   · Motor: No focal motor deficit  Skin: No rashes or lesions present   Psychological: Cooperative, no exaggerated pain behaviors       Assessment:    Diagnosis Orders   1. Neuropathic pain        2. Degenerative disc disease, lumbar        3. Polyarthralgia        4. Lumbar spondylosis              William Belcher is a 63 y.o.male presenting to the pain clinic for evaluation of chronic low back, bilateral leg, and bilateral feet pain.  Due to his axial spondyloarthropathy he does have advanced arthritis in his lumbar spine as evident on x-ray.  His physical examination is consistent with lumbar facet mediated pain.  He has obtained significant relief in his low back after his lumbar radiofrequency ablation back in November 2020.     He responded to his repeat lumbar radiofrequency ablation in October 2023.  I have made a referral for a NCS/EMG to evaluate for peripheral neuropathy versus lumbar radiculopathy.    Plan:   The following treatment recommendations and plan were discussed in detail with William Belcher.    Imaging:   I have reviewed patient’s imaging of XR L-spine and new MRI L-spine.    Analgesics:   Patient is taking Naproxen. Patient is advised to take as prescribed and not take on an empty stomach.     Adjuvants:   With associated myofascial pain, I have continued the Cymbalta

## 2024-07-01 RX ORDER — DULOXETIN HYDROCHLORIDE 60 MG/1
60 CAPSULE, DELAYED RELEASE ORAL DAILY
Qty: 90 CAPSULE | Refills: 3 | Status: SHIPPED | OUTPATIENT
Start: 2024-07-01 | End: 2025-06-26

## 2024-09-24 RX ORDER — DULOXETIN HYDROCHLORIDE 60 MG/1
60 CAPSULE, DELAYED RELEASE ORAL DAILY
Qty: 90 CAPSULE | Refills: 3 | OUTPATIENT
Start: 2024-09-24 | End: 2025-09-19

## 2024-10-02 RX ORDER — DULOXETIN HYDROCHLORIDE 60 MG/1
60 CAPSULE, DELAYED RELEASE ORAL DAILY
Qty: 90 CAPSULE | Refills: 3 | OUTPATIENT
Start: 2024-10-02 | End: 2025-09-27

## 2024-10-08 ENCOUNTER — PROCEDURE VISIT (OUTPATIENT)
Dept: NEUROLOGY | Age: 64
End: 2024-10-08

## 2024-10-08 DIAGNOSIS — R20.0 BILATERAL LEG NUMBNESS: Primary | ICD-10-CM

## 2024-10-08 DIAGNOSIS — G62.9 NEUROPATHY: ICD-10-CM

## 2024-10-08 DIAGNOSIS — M54.16 LUMBAR RADICULOPATHY: ICD-10-CM

## 2024-11-01 ENCOUNTER — OFFICE VISIT (OUTPATIENT)
Dept: PHYSICAL MEDICINE AND REHAB | Age: 64
End: 2024-11-01
Payer: MEDICARE

## 2024-11-01 VITALS
WEIGHT: 294 LBS | SYSTOLIC BLOOD PRESSURE: 126 MMHG | HEIGHT: 72 IN | BODY MASS INDEX: 39.82 KG/M2 | DIASTOLIC BLOOD PRESSURE: 74 MMHG

## 2024-11-01 DIAGNOSIS — M47.816 LUMBAR SPONDYLOSIS: ICD-10-CM

## 2024-11-01 DIAGNOSIS — M51.362 DEGENERATION OF INTERVERTEBRAL DISC OF LUMBAR REGION WITH DISCOGENIC BACK PAIN AND LOWER EXTREMITY PAIN: Primary | ICD-10-CM

## 2024-11-01 DIAGNOSIS — M79.2 NEUROPATHIC PAIN: ICD-10-CM

## 2024-11-01 DIAGNOSIS — M25.50 POLYARTHRALGIA: ICD-10-CM

## 2024-11-01 DIAGNOSIS — G89.29 OTHER CHRONIC PAIN: ICD-10-CM

## 2024-11-01 PROCEDURE — G8484 FLU IMMUNIZE NO ADMIN: HCPCS | Performed by: ANESTHESIOLOGY

## 2024-11-01 PROCEDURE — 99214 OFFICE O/P EST MOD 30 MIN: CPT | Performed by: ANESTHESIOLOGY

## 2024-11-01 PROCEDURE — 3017F COLORECTAL CA SCREEN DOC REV: CPT | Performed by: ANESTHESIOLOGY

## 2024-11-01 PROCEDURE — 1123F ACP DISCUSS/DSCN MKR DOCD: CPT | Performed by: ANESTHESIOLOGY

## 2024-11-01 PROCEDURE — G8427 DOCREV CUR MEDS BY ELIG CLIN: HCPCS | Performed by: ANESTHESIOLOGY

## 2024-11-01 PROCEDURE — G8417 CALC BMI ABV UP PARAM F/U: HCPCS | Performed by: ANESTHESIOLOGY

## 2024-11-01 PROCEDURE — 1036F TOBACCO NON-USER: CPT | Performed by: ANESTHESIOLOGY

## 2024-11-01 NOTE — PROGRESS NOTES
Functionality Assessment/Goals Worksheet     On a scale of 0 (Does not Interfere) to 10 (Completely Interferes)     1.  Which number describes how during the past week pain has interfered with           the following:  A.  General Activity:  3  B.  Mood: 5  C.  Walking Ability:  3  D.  Normal Work (Includes both work outside the home and housework):  4  E.  Relations with Other People:   6  F.  Sleep:   5  G.  Enjoyment of Life:   5    2.  Patient Prefers to Take their Pain Medications:     []  On a regular basis   []  Only when necessary    [x]  Does not take pain medications    3.  What are the Patient's Goals/Expectations for Visiting Pain Management?     [x]  Learn about my pain    []  Receive Medication   []  Physical Therapy     []  Treat Depression   []  Receive Injections    []  Treat Sleep   []  Deal with Anxiety and Stress   []  Treat Opoid Dependence/Addiction   []  Other:                                
surgery to treat his ongoing L5 radiculopathy and would like to see Dr. Olvera in [place], [place].        Plan:   The following treatment recommendations and plan were discussed in detail with William Belcher.    Imaging:   I have reviewed patient’s EMG/NCS and results were discussed in detail with the patient.        Analgesics:   Patient is taking Naproxen. Patient is advised to take as prescribed and not take on an empty stomach.     Adjuvants:   With associated myofascial pain, I have continued the Cymbalta at 30 mg daily.    For his neuropathic component of pain, I have continued patient on Lyrica 75 mg twice a day.     Interventions:   None    Anticoagulation/NPO Recommendations:   None    Multidisciplinary Pain Management:   In the presence of complex, chronic, and multi-factorial pain, the importance of a multidisciplinary approach to pain management in the patient’s management regimen was emphasized and discussed in great detail.   WEIGHT LOSS: I discussed with the patient about 12-hour intermittent fasting, making sure he is taking daily weights on his scale, setting a goal, and we will looking for a 30 pound weight loss at our follow-up visit.  Patient wants to hold off on a referral to a dietitian.    Referrals:  Dr. Wade rivera for L5/S1 decompression    Prescriptions Written This Visit:   None     Follow-up: 15 weeks      Marco Antonio Donald DO  Interventional Pain Management/PM&R   [facility] [place]

## 2025-03-10 ENCOUNTER — OFFICE VISIT (OUTPATIENT)
Dept: PHYSICAL MEDICINE AND REHAB | Age: 65
End: 2025-03-10
Payer: MEDICARE

## 2025-03-10 VITALS — SYSTOLIC BLOOD PRESSURE: 120 MMHG | DIASTOLIC BLOOD PRESSURE: 66 MMHG

## 2025-03-10 DIAGNOSIS — M47.816 LUMBAR SPONDYLOSIS: Primary | ICD-10-CM

## 2025-03-10 DIAGNOSIS — G89.29 OTHER CHRONIC PAIN: ICD-10-CM

## 2025-03-10 PROCEDURE — 1123F ACP DISCUSS/DSCN MKR DOCD: CPT | Performed by: NURSE PRACTITIONER

## 2025-03-10 PROCEDURE — 99214 OFFICE O/P EST MOD 30 MIN: CPT | Performed by: NURSE PRACTITIONER

## 2025-03-10 PROCEDURE — 1036F TOBACCO NON-USER: CPT | Performed by: NURSE PRACTITIONER

## 2025-03-10 PROCEDURE — G8417 CALC BMI ABV UP PARAM F/U: HCPCS | Performed by: NURSE PRACTITIONER

## 2025-03-10 PROCEDURE — G8427 DOCREV CUR MEDS BY ELIG CLIN: HCPCS | Performed by: NURSE PRACTITIONER

## 2025-03-10 PROCEDURE — 3017F COLORECTAL CA SCREEN DOC REV: CPT | Performed by: NURSE PRACTITIONER

## 2025-03-10 NOTE — PROGRESS NOTES
Functionality Assessment/Goals Worksheet     On a scale of 0 (Does not Interfere) to 10 (Completely Interferes)     1.  Which number describes how during the past week pain has interfered with           the following:  A.  General Activity:  4  B.  Mood: 4  C.  Walking Ability:  4  D.  Normal Work (Includes both work outside the home and housework):  4  E.  Relations with Other People:   0  F.  Sleep:   4  G.  Enjoyment of Life:   4    2.  Patient Prefers to Take their Pain Medications:     []  On a regular basis   []  Only when necessary    [x]  Does not take pain medications    3.  What are the Patient's Goals/Expectations for Visiting Pain Management?     [x]  Learn about my pain    []  Receive Medication   []  Physical Therapy     []  Treat Depression   []  Receive Injections    []  Treat Sleep   []  Deal with Anxiety and Stress   []  Treat Opoid Dependence/Addiction   []  Other:

## 2025-03-10 NOTE — PROGRESS NOTES
Chronic Pain/PM&R Clinic Note     Encounter Date:11/1/24    Subjective:   Chief Complaint:   Chief Complaint   Patient presents with    Follow-up     16 week follow up  Saw [facility]surgical associates          History of Present Illness:   William Belcher is a 64 y.o. male seen in the clinic initially on 09/04/20 upon request from Johnathan Ochoa DO (Rheumatology) for his history of chronic low back pain.  He has history of axial spondyloarthropathy.  He has multiple pain complaints.  He states that his feet are most bothersome to him and that this is been going on for greater than 15 years.  In addition, he states that his low back and bilateral leg pain has progressively getting worse over 5 years.  He does not have any recent trauma/fall/inciting event however he did sustain a fall from height greater than 15 feet over 20 years ago.     He describes low back pain as midline with radiation towards both flanks.  The pain does radiate down both legs on the posterior aspect of the thighs and into the posterior aspect of the calves and plantar aspects of the feet.  He has associated numbness/tingling in both legs and feet diffusely throughout.  He states that he feels like his legs have lost some strength and has difficulty with balance.  In addition he states it is becoming increasingly difficult to walk further than a mile due to the pain in his feet and low back.  His pain is exacerbated practically when he sits down to rest and at nighttime.  He does have difficulty with sleep.  He states that there is not much that does improve his pain, and he feels like the medications he is currently taking are not providing much relief.  He denies any saddle anesthesia or bowel/bladder incontinence.    Of note, he has an MRI L-spine performed in 2013 with multilevel degenerative changes with associated multilevel spinal stenosis, neuroforaminal stenosis and facet arthropathy.  He states that he had previous lumbar epidural

## 2025-04-04 NOTE — DISCHARGE INSTRUCTIONS
rub.  Family and friends who visit you should not touch the surgical wound or dressings.  Family and friends should clean their hands with soap and water or an alcohol-based hand rub before and after visiting you. If you do not see them clean their hands, ask them to clean their hands.    What do I need to do when I go home from the hospital?  Before you go home, your doctor or nurse should explain everything you need to know about taking care of your wound. Make sure you understand how to care for your wound before you leave the hospital.  Always clean your hands before and after caring for your wound.  Before you go home, make sure you know who to contact if you have questions or problems after you get home.  If you have any symptoms of an infection, such as redness and pain at the surgery site, drainage, or fever, call your doctor immediately.    If you have additional questions, please ask your doctor or nurse.               e

## 2025-04-10 ENCOUNTER — HOSPITAL ENCOUNTER (OUTPATIENT)
Age: 65
Setting detail: OUTPATIENT SURGERY
Discharge: HOME | End: 2025-04-10
Attending: ANESTHESIOLOGY | Admitting: ANESTHESIOLOGY
Payer: MEDICARE

## 2025-04-10 ENCOUNTER — APPOINTMENT (OUTPATIENT)
Dept: GENERAL RADIOLOGY | Age: 65
End: 2025-04-10
Attending: ANESTHESIOLOGY
Payer: MEDICARE

## 2025-04-10 VITALS
RESPIRATION RATE: 16 BRPM | DIASTOLIC BLOOD PRESSURE: 60 MMHG | OXYGEN SATURATION: 95 % | BODY MASS INDEX: 40.28 KG/M2 | HEIGHT: 72 IN | TEMPERATURE: 97.4 F | HEART RATE: 68 BPM | SYSTOLIC BLOOD PRESSURE: 128 MMHG | WEIGHT: 297.4 LBS

## 2025-04-10 PROCEDURE — 6360000002 HC RX W HCPCS: Performed by: ANESTHESIOLOGY

## 2025-04-10 PROCEDURE — 64635 DESTROY LUMB/SAC FACET JNT: CPT | Performed by: ANESTHESIOLOGY

## 2025-04-10 PROCEDURE — 7100000010 HC PHASE II RECOVERY - FIRST 15 MIN: Performed by: ANESTHESIOLOGY

## 2025-04-10 PROCEDURE — 2709999900 HC NON-CHARGEABLE SUPPLY: Performed by: ANESTHESIOLOGY

## 2025-04-10 PROCEDURE — 64636 DESTROY L/S FACET JNT ADDL: CPT | Performed by: ANESTHESIOLOGY

## 2025-04-10 PROCEDURE — 3600000056 HC PAIN LEVEL 4 BASE: Performed by: ANESTHESIOLOGY

## 2025-04-10 RX ORDER — LIDOCAINE HYDROCHLORIDE 20 MG/ML
INJECTION, SOLUTION EPIDURAL; INFILTRATION; INTRACAUDAL; PERINEURAL PRN
Status: DISCONTINUED | OUTPATIENT
Start: 2025-04-10 | End: 2025-04-10 | Stop reason: ALTCHOICE

## 2025-04-10 RX ORDER — LIDOCAINE HYDROCHLORIDE 10 MG/ML
INJECTION, SOLUTION EPIDURAL; INFILTRATION; INTRACAUDAL; PERINEURAL PRN
Status: DISCONTINUED | OUTPATIENT
Start: 2025-04-10 | End: 2025-04-10 | Stop reason: ALTCHOICE

## 2025-04-10 ASSESSMENT — PAIN - FUNCTIONAL ASSESSMENT
PAIN_FUNCTIONAL_ASSESSMENT: NONE - DENIES PAIN
PAIN_FUNCTIONAL_ASSESSMENT: 0-10

## 2025-04-10 ASSESSMENT — PAIN SCALES - GENERAL: PAINLEVEL_OUTOF10: 5

## 2025-04-10 NOTE — PROGRESS NOTES
1504: Patient to phase 2 recovery room via cart. Patient is awake and alert. Report received from surgical RN. Patient's vitals obtained, see charting.   1506: Patient is denying pain and nausea. Patient denying wanting anything to eat/drink. Patient verbalizes understanding of discharge instructions.   1509: Patient dressing.   1512: Patient ambulated to the door and discharged home in stable condition.

## 2025-04-10 NOTE — H&P
Today, patient presents for planned radiofrequency ablation targeting bilateral L4/L5 and L5/S1 facet joints    This note is reflective of the patient's previous visit for evaluation. We will proceed with today's planned procedure. Since patient's last visit for evaluation, there have been no interval changes in medical history. Patient has no new numbness, weakness, or focal neurological deficit since evaluation. Patient has no contraindications to injection (no anticoagulation or recent antibiotic intake for active infections), and has a  present or is able to drive themselves (as discussed and cleared by physician).  Allergies to latex, contrast dye, and steroid medications have been confirmed with the patient prior to the procedure.  NPO necessity has been assessed and accepted based on procedure complexity. The risks and benefits of the procedure have been explained including but are not limited to infection, bleeding, paralysis, immediate post procedure weakness, and dizziness; the patient acknowledges understanding and desires to proceed with the procedure. Patient has signed consent for same procedure as discussed in previous clinic encounter. All other questions and concerns were addressed at bedside. See procedure note for full details.       Post procedure Instructions: The patient was advised not to drive during the day of the procedure and not to engage in any significant decision making (unless otherwise states by physician). The patient was also advised to be cautious with walking/activity for 24 hours following today's visit and asked not to engage in over-exertion (unless otherwise states by physician). After this time, it is ok to resume pre-procedure level of activity. Patient advised to apply ice to site of injection in situations of pain and discomfort. Patient advised to not submerge site of injection during bath or pool activities for approximately 24 hours post-procedure. Patient

## 2025-04-11 NOTE — PROCEDURES
Pre-operative Diagnosis: Lumbar facet pain     Post-operative Diagnosis: Lumbar facet pain     Procedure: Bilateral lumbar thermal radiofrequency ablation targeting facet joints L4/L5 and L5/S1    Procedure Description:  After consent was obtained, the patient was placed in the prone position with a pillow under the abdomen to reduce the lordotic curve of the lumbar spine. The lower back was prepped with chloraprep and draped in a sterile fashion.  Then, 0.5 cc of 1 % lidocaine was used for local anesthesia of the skin and superficial subcutaneous tissues.  Three 20-gauge 100mm SMK cannulas with 10-mm active tips were advanced under fluoroscopic guidance in an AP view to the junction of the superior articular process and the transverse process of the L4 and L5 vertebra and at the sacral ala. There were no paresthesias, heme or CSF obtained.  Needle placement was confirmed using AP and oblique views. This procedure was repeated on the contralateral side.      Sensory and motor stimulation at 50Hz and 2Hz and impedance measurements were carried out having reached threshold at:     RIGHT  L4: 0.2V/3V/150-300 Ohms  L5: 0.2V/3V/150-300 Ohms  SA: 0.2V/3V/150-300 Ohms     LEFT  L4: 0.2V/3V/150-300 Ohms  L5: 0.2V/3V/150-300 Ohms  SA: 0.2V/3V/150-300 Ohms        Then, 1cc of 2% Lidocaine was injected at the site. Temperature was then raised to 80 degrees centigrade for 90 seconds with a 15 second temperature ramp. No pain was reported during the lesioning. The needles were then withdrawn without complications. The patient tolerated the procedure well. The patient was transported to the recovery room and discharged in ambulatory fashion.    Procedural Complications: None  Estimated Blood Loss: 0 mL          Marco Antonio Donald DO  Interventional Pain Management/PM&R   [facility]

## 2025-04-28 NOTE — TELEPHONE ENCOUNTER
[facility] called and said that this script had been transferred to them from [place] because they were unable to fill it for the patient. However, Samanta Ghotra states that they will need a new script from the provider so that they can get this taken care of for the patient. She said that they will need chart notes as well for a prior auth on this med as well. Also they need clarification with the script because it says 300 mg dose but then 150 mg/ml. They want to make sure that they are dispensing this correctly. Please advise.     Fax # [phone]    Office # [phone]
Admission Reconciliation is Completed  Discharge Reconciliation is Completed

## 2025-05-08 ENCOUNTER — OFFICE VISIT (OUTPATIENT)
Dept: PHYSICAL MEDICINE AND REHAB | Age: 65
End: 2025-05-08
Payer: MEDICARE

## 2025-05-08 VITALS
SYSTOLIC BLOOD PRESSURE: 122 MMHG | WEIGHT: 297 LBS | BODY MASS INDEX: 40.23 KG/M2 | HEIGHT: 72 IN | DIASTOLIC BLOOD PRESSURE: 64 MMHG

## 2025-05-08 DIAGNOSIS — M25.50 POLYARTHRALGIA: ICD-10-CM

## 2025-05-08 DIAGNOSIS — E11.42 DIABETIC POLYNEUROPATHY ASSOCIATED WITH TYPE 2 DIABETES MELLITUS (HCC): ICD-10-CM

## 2025-05-08 DIAGNOSIS — G89.29 OTHER CHRONIC PAIN: ICD-10-CM

## 2025-05-08 DIAGNOSIS — M47.816 LUMBAR SPONDYLOSIS: Primary | ICD-10-CM

## 2025-05-08 DIAGNOSIS — M51.362 DEGENERATION OF INTERVERTEBRAL DISC OF LUMBAR REGION WITH DISCOGENIC BACK PAIN AND LOWER EXTREMITY PAIN: ICD-10-CM

## 2025-05-08 PROCEDURE — 1036F TOBACCO NON-USER: CPT | Performed by: NURSE PRACTITIONER

## 2025-05-08 PROCEDURE — 3017F COLORECTAL CA SCREEN DOC REV: CPT | Performed by: NURSE PRACTITIONER

## 2025-05-08 PROCEDURE — 3046F HEMOGLOBIN A1C LEVEL >9.0%: CPT | Performed by: NURSE PRACTITIONER

## 2025-05-08 PROCEDURE — G8427 DOCREV CUR MEDS BY ELIG CLIN: HCPCS | Performed by: NURSE PRACTITIONER

## 2025-05-08 PROCEDURE — 1123F ACP DISCUSS/DSCN MKR DOCD: CPT | Performed by: NURSE PRACTITIONER

## 2025-05-08 PROCEDURE — 99214 OFFICE O/P EST MOD 30 MIN: CPT | Performed by: NURSE PRACTITIONER

## 2025-05-08 PROCEDURE — G8417 CALC BMI ABV UP PARAM F/U: HCPCS | Performed by: NURSE PRACTITIONER

## 2025-05-08 PROCEDURE — 2022F DILAT RTA XM EVC RTNOPTHY: CPT | Performed by: NURSE PRACTITIONER

## 2025-05-08 NOTE — PROGRESS NOTES
Functionality Assessment/Goals Worksheet     On a scale of 0 (Does not Interfere) to 10 (Completely Interferes)     1.  Which number describes how during the past week pain has interfered with           the following:  A.  General Activity:  1  B.  Mood: 0  C.  Walking Ability:  1  D.  Normal Work (Includes both work outside the home and housework):  1  E.  Relations with Other People:   1  F.  Sleep:   1  G.  Enjoyment of Life:   1    2.  Patient Prefers to Take their Pain Medications:     []  On a regular basis   []  Only when necessary    [x]  Does not take pain medications    3.  What are the Patient's Goals/Expectations for Visiting Pain Management?     [x]  Learn about my pain    []  Receive Medication   []  Physical Therapy     []  Treat Depression   []  Receive Injections    []  Treat Sleep   []  Deal with Anxiety and Stress   []  Treat Opoid Dependence/Addiction   []  Other:                                
   1997, 2008 (right) 2007 (Left)    LIPOMA RESECTION  2010    Chest    PAIN MANAGEMENT PROCEDURE Bilateral 09/22/2020    medial branch blocks at bilateral L4, L5, and S1 performed by Marco Antonio Donald DO at [facility] OR    PAIN MANAGEMENT PROCEDURE Bilateral 10/13/2020    medial branch blocks at bilateral L4, L5, and S1 performed by Marco Antonio Donald DO at [facility] OR    PAIN MANAGEMENT PROCEDURE Bilateral 11/03/2020    radiofrequency ablation at bilateral L4, L5, and S1 performed by Marco Antonio Donald DO at [facility] OR    PAIN MANAGEMENT PROCEDURE Bilateral 10/17/2023    radiofrequency ablation bilateral Lumbar 4/5, 5/Sacral 1 facet joints performed by Marco Antonio Donald DO at [facility] OR    PAIN MANAGEMENT PROCEDURE Bilateral 4/10/2025    radiofrequency ablation bilateral Lumbar 4/5, 5/Sacral 1 facet joints performed by Marco Antonio Donald DO at [facility] OR    ROTATOR CUFF REPAIR Left 08/2019       Family History   Problem Relation Age of Onset    Cancer Mother         brain    High Blood Pressure Father     High Blood Pressure Brother     Diabetes Brother        Social History     Socioeconomic History    Marital status:      Spouse name: Not on file    Number of children: Not on file    Years of education: Not on file    Highest education level: Not on file   Occupational History    Not on file   Tobacco Use    Smoking status: Never    Smokeless tobacco: Never   Vaping Use    Vaping status: Never Used   Substance and Sexual Activity    Alcohol use: Yes     Alcohol/week: 12.0 standard drinks of alcohol     Types: 12 Cans of beer per week     Comment: socially    Drug use: Never    Sexual activity: Not on file   Other Topics Concern    Not on file   Social History Narrative    Not on file     Social Drivers of Health     Financial Resource Strain: Not on file   Food Insecurity: Not on file   Transportation Needs: Not on file   Physical Activity: Not on

## 2025-07-03 RX ORDER — DULOXETIN HYDROCHLORIDE 60 MG/1
60 CAPSULE, DELAYED RELEASE ORAL DAILY
Qty: 90 CAPSULE | Refills: 0 | Status: SHIPPED | OUTPATIENT
Start: 2025-07-03 | End: 2025-10-01

## 2025-08-27 ENCOUNTER — HOSPITAL ENCOUNTER (OUTPATIENT)
Dept: GENERAL RADIOLOGY | Age: 65
Discharge: HOME | End: 2025-08-27
Payer: MEDICARE

## 2025-08-27 LAB
ALBUMIN SERPL BCG-MCNC: 4.1 G/DL (ref 3.4–4.9)
ALP SERPL-CCNC: 84 U/L (ref 40–129)
ALT SERPL W/O P-5'-P-CCNC: 47 U/L (ref 10–50)
ANION GAP SERPL CALC-SCNC: 11 MEQ/L (ref 8–16)
AST SERPL-CCNC: 23 U/L (ref 10–50)
BASOPHILS ABSOLUTE: 0 THOU/MM3 (ref 0–0.1)
BASOPHILS NFR BLD AUTO: 0.5 %
BILIRUB SERPL-MCNC: 0.3 MG/DL (ref 0.3–1.2)
BUN SERPL-MCNC: 27 MG/DL (ref 8–23)
CALCIUM SERPL-MCNC: 9.6 MG/DL (ref 8.5–10.5)
CHLORIDE SERPL-SCNC: 101 MEQ/L (ref 98–111)
CO2 SERPL-SCNC: 26 MEQ/L (ref 22–29)
CREAT SERPL-MCNC: 1 MG/DL (ref 0.7–1.2)
DEPRECATED RDW RBC AUTO: 43.8 FL (ref 35–45)
EOSINOPHIL NFR BLD AUTO: 5.6 %
EOSINOPHILS ABSOLUTE: 0.3 THOU/MM3 (ref 0–0.4)
ERYTHROCYTE [DISTWIDTH] IN BLOOD BY AUTOMATED COUNT: 13.2 % (ref 11.5–14.5)
GFR SERPL CREATININE-BSD FRML MDRD: 83 ML/MIN/1.73M2
GLUCOSE SERPL-MCNC: 113 MG/DL (ref 74–109)
HCT VFR BLD AUTO: 39.8 % (ref 42–52)
HGB BLD-MCNC: 13.2 GM/DL (ref 14–18)
IMM GRANULOCYTES # BLD AUTO: 0.03 THOU/MM3 (ref 0–0.07)
IMM GRANULOCYTES NFR BLD AUTO: 0.5 %
LYMPHOCYTES ABSOLUTE: 1.9 THOU/MM3 (ref 1–4.8)
LYMPHOCYTES NFR BLD AUTO: 32.6 %
MCH RBC QN AUTO: 30.1 PG (ref 26–33)
MCHC RBC AUTO-ENTMCNC: 33.2 GM/DL (ref 32.2–35.5)
MCV RBC AUTO: 90.9 FL (ref 80–94)
MONOCYTES ABSOLUTE: 0.6 THOU/MM3 (ref 0.4–1.3)
MONOCYTES NFR BLD AUTO: 10.8 %
NEUTROPHILS ABSOLUTE: 2.9 THOU/MM3 (ref 1.8–7.7)
NEUTROPHILS NFR BLD AUTO: 50 %
NRBC BLD AUTO-RTO: 0 /100 WBC
PLATELET # BLD AUTO: 223 THOU/MM3 (ref 130–400)
PMV BLD AUTO: 10.8 FL (ref 9.4–12.4)
POTASSIUM SERPL-SCNC: 4.2 MEQ/L (ref 3.5–5.2)
PROT SERPL-MCNC: 7 G/DL (ref 6.4–8.3)
RBC # BLD AUTO: 4.38 MILL/MM3 (ref 4.7–6.1)
SODIUM SERPL-SCNC: 138 MEQ/L (ref 135–145)
WBC # BLD AUTO: 5.8 THOU/MM3 (ref 4.8–10.8)

## 2025-08-27 PROCEDURE — 80053 COMPREHEN METABOLIC PANEL: CPT

## 2025-08-27 PROCEDURE — 36415 COLL VENOUS BLD VENIPUNCTURE: CPT

## 2025-08-27 PROCEDURE — 85025 COMPLETE CBC W/AUTO DIFF WBC: CPT

## (undated) DEVICE — NEEDLE SPNL 22GA L3.5IN BLK HUB S STL REG WALL FIT STYL W/

## (undated) DEVICE — GAUZE,SPONGE,4"X4",12PLY,STERILE,LF,2'S: Brand: MEDLINE

## (undated) DEVICE — SYRINGE MED 5ML STD CLR PLAS LUERLOCK TIP N CTRL DISP

## (undated) DEVICE — HYPODERMIC SAFETY NEEDLE: Brand: MAGELLAN

## (undated) DEVICE — MARKER,SKIN,WI/RULER AND LABELS: Brand: MEDLINE

## (undated) DEVICE — SYRINGE MED 10ML LUERLOCK TIP W/O SFTY DISP

## (undated) DEVICE — GAUZE SPONGES,USP TYPE VII GAUZE, 12 PLY: Brand: CURITY

## (undated) DEVICE — Z DISCONTINUED NEEDLE HYPO 18GA L1.5IN THN WALL PIVOTING SHLD BVL ORIENTED

## (undated) DEVICE — SHEET,DRAPE,3/4,53X77,STERILE: Brand: MEDLINE

## (undated) DEVICE — SYRINGE MED 3ML CLR PLAS STD N CTRL LUERLOCK TIP DISP

## (undated) DEVICE — SYRINGE MEDICAL 3ML CLEAR PLASTIC STANDARD NON CONTROL LUERLOCK TIP DISPOSABLE

## (undated) DEVICE — NON-DEHP CATHETER EXTENSION SET, MALE LUER LOCK ADAPTER

## (undated) DEVICE — COUNTER NDL 40 COUNT HLD 70 FOAM BLK ADH W/ MAG

## (undated) DEVICE — NEEDLE SYR 18GA L1.5IN RED PLAS HUB S STL BLNT FILL W/O

## (undated) DEVICE — LABEL MED DRUG CUST

## (undated) DEVICE — APPLICATOR MEDICATED 3 CC SOLUTION CLR STRL CHLORAPREP

## (undated) DEVICE — 1840 FOAM BLOCK NEEDLE COUNTER: Brand: DEVON

## (undated) DEVICE — GLOVE ORANGE PI 8 1/2   MSG9085

## (undated) DEVICE — TOWEL,OR,DSP,ST,BLUE,STD,4/PK,20PK/CS: Brand: MEDLINE

## (undated) DEVICE — CANNULA RF 20 GAUGEX100MM 10MM

## (undated) DEVICE — SC PAIN PACK: Brand: MEDLINE INDUSTRIES, INC.

## (undated) DEVICE — APPLICATOR MEDICATED 26 CC SOLUTION HI LT ORNG CHLORAPREP

## (undated) DEVICE — SYRINGE MED 7ML PLAS LUERSLIP LOSS OF RESISTANCE EPILOR

## (undated) DEVICE — GLOVE BIOGEL POWDER FREE SZ 8

## (undated) DEVICE — 6 ML SYRINGE LUER-LOCK TIP: Brand: MONOJECT